# Patient Record
Sex: MALE | Race: WHITE | NOT HISPANIC OR LATINO | Employment: FULL TIME | URBAN - METROPOLITAN AREA
[De-identification: names, ages, dates, MRNs, and addresses within clinical notes are randomized per-mention and may not be internally consistent; named-entity substitution may affect disease eponyms.]

---

## 2017-04-18 ENCOUNTER — OFFICE VISIT (OUTPATIENT)
Dept: FAMILY MEDICINE | Facility: CLINIC | Age: 52
End: 2017-04-18

## 2017-04-18 VITALS
DIASTOLIC BLOOD PRESSURE: 87 MMHG | BODY MASS INDEX: 25.92 KG/M2 | WEIGHT: 175 LBS | OXYGEN SATURATION: 99 % | HEART RATE: 60 BPM | RESPIRATION RATE: 14 BRPM | SYSTOLIC BLOOD PRESSURE: 130 MMHG | HEIGHT: 69 IN | TEMPERATURE: 98.2 F

## 2017-04-18 DIAGNOSIS — L57.0 ACTINIC KERATOSIS: ICD-10-CM

## 2017-04-18 DIAGNOSIS — Z11.59 NEED FOR HEPATITIS C SCREENING TEST: ICD-10-CM

## 2017-04-18 DIAGNOSIS — Z23 NEED FOR PROPHYLACTIC VACCINATION WITH STREPTOCOCCUS PNEUMONIAE (PNEUMOCOCCUS) AND INFLUENZA VACCINES: ICD-10-CM

## 2017-04-18 DIAGNOSIS — Z00.00 ROUTINE HISTORY AND PHYSICAL EXAMINATION OF ADULT: Primary | ICD-10-CM

## 2017-04-18 RX ORDER — FLUOROURACIL 50 MG/G
CREAM TOPICAL DAILY
Qty: 40 G | Refills: 1 | Status: SHIPPED | OUTPATIENT
Start: 2017-04-18 | End: 2017-12-05

## 2017-04-18 NOTE — MR AVS SNAPSHOT
After Visit Summary   4/18/2017    Danie Newell    MRN: [id number]           Patient Information     Date Of Birth          1965        Visit Information        Provider Department      4/18/2017 9:40 AM Darius Rhoades MD [facility]        Today's Diagnoses     Routine history and physical examination of adult    -  1    Need for hepatitis C screening test        Need for prophylactic vaccination with Streptococcus pneumoniae (Pneumococcus) and Influenza vaccines        Actinic keratosis          Care Instructions    Will try the 5 FU cream on your chest lesion.  Will get your lab test to you.      Your medication list is printed, please keep this with you, it is helpful to bring this current list to any other medical appointments, the emergency room or hospital.    If you had lab testing today and your results are reassuring or normal they will be be mailed to you within 7 days.     If the lab tests need quick action we will call you with the results.   The phone number we will call with results is # [phone] (home) . If this is not the best number please call our clinic and change the number.    If you need any refills please call your pharmacy and they will contact us.    If you have any further concerns or wish to schedule another appointment you must call our office during normal business hours  [phone] (8-5:00 M-F)  If you have urgent medical questions that cannot wait  you may also call [phone] at any time of day.  If you have a medical emergency please call [phone].    Thank you for coming to [facility].          Follow-ups after your visit        Follow-up notes from your care team     Return in about 6 weeks (around 5/30/2017).      Who to contact     Please call your clinic at [phone] to:    Ask questions about your health    Make or cancel appointments    Discuss your medicines    Learn about your test results    Speak to your doctor   If you  "have compliments or concerns about an experience at your clinic, or if you wish to file a complaint, please contact [facility] Patient Relations at [phone] or email us at [email or web address]         Additional Information About Your Visit        MyChart Information     [email or web address] gives you secure access to your electronic health record. If you see a primary care provider, you can also send messages to your care team and make appointments. If you have questions, please call your primary care clinic.  If you do not have a primary care provider, please call [phone] and they will assist you.      Meridian-IQ is an electronic gateway that provides easy, online access to your medical records. With Meridian-IQ, you can request a clinic appointment, read your test results, renew a prescription or communicate with your care team.     To access your existing account, please contact your [facility] or call [phone] for assistance.        Care EveryWhere ID     This is your Care EveryWhere ID. This could be used by other organizations to access your [place] medical records  [id number]        Your Vitals Were     Pulse Temperature Respirations Height Pulse Oximetry BMI (Body Mass Index)    60 98.2  F (36.8  C) (Oral) 14 5' 8.9\" (175 cm) 99% 25.92 kg/m2       Blood Pressure from Last 3 Encounters:   04/18/17 130/87   12/14/16 123/75   11/22/16 138/87    Weight from Last 3 Encounters:   04/18/17 175 lb (79.4 kg)   12/14/16 182 lb 6.4 oz (82.7 kg)   11/22/16 180 lb 3.2 oz (81.7 kg)              We Performed the Following     ADMIN VACCINE, INITIAL     Hepatitis C Antibody ([facility])     Pneumococcal vaccine 23 valent PPSV23  (Pneumovax) [08844]          Today's Medication Changes          These changes are accurate as of: 4/18/17 10:36 AM.  If you have any questions, ask your nurse or doctor.               Start taking these medicines.        " Dose/Directions    fluorouracil 5 % cream   Commonly known as:  EFUDEX   Used for:  Actinic keratosis   Started by:  Darius Rhoades MD        Apply topically daily   Quantity:  40 g   Refills:  1            Where to get your medicines      These medications were sent to [id number] - [place], [place] - [address]  AT [facility] [address]  [address] [place] [address] [address], [place] [facility] [address]     Phone:  [phone]     fluorouracil 5 % cream                Primary Care Provider Office Phone # Fax #    Darius Rhoades [phone]       [facility] [address] [place] [id number]        Thank you!     Thank you for choosing [facility]  for your care. Our goal is always to provide you with excellent care. Hearing back from our patients is one way we can continue to improve our services. Please take a few minutes to complete the written survey that you may receive in the mail after your visit with us. Thank you!             Your Updated Medication List - Protect others around you: Learn how to safely use, store and throw away your medicines at www.disposemymeds.org.          This list is accurate as of: 4/18/17 10:36 AM.  Always use your most recent med list.                   Brand Name Dispense Instructions for use    fluorouracil 5 % cream    EFUDEX    40 g    Apply topically daily

## 2017-04-18 NOTE — PROGRESS NOTES
"Chief Complaint   Patient presents with     Physical     Derm Problem     lesion on chest      ROS  CONSTITUTIONAL:  NEGATIVE for fever, chills, change in weight  RESP:  NEGATIVE for significant cough or SOB  CV:  NEGATIVE for chest pain, palpitations or peripheral edema  GI:  NEGATIVE for nausea, abdominal pain, heartburn, or change in bowel habits  : NEGATIVE for dysuria, hematuria, and increased frequency.    PMH, Fam Hx, and Social Hx: Reviewed  Family History   Problem Relation Age of Onset     Breast Cancer Mother      Breast Cancer Paternal Grandmother      C.A.D. No family hx of      DIABETES No family hx of      Hypertension No family hx of      Cancer - colorectal No family hx of      Prostate Cancer No family hx of      Coronary Artery Disease No family hx of      Hyperlipidemia No family hx of      Colon Cancer No family hx of      Anesthesia Reaction No family hx of      Other Cancer Father 77     Waldonstrom Microglobulinemia sub type of non hodgkin lymphoma       Vital Signs: Reviewed  Blood pressure 130/87, pulse 60, temperature 98.2  F (36.8  C), temperature source Oral, resp. rate 14, height 5' 8.9\" (175 cm), weight 175 lb (79.4 kg), SpO2 99 %.  Body mass index is 25.92 kg/(m^2).    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  He feels good, he is back to normal, he was seen for Lofgrens Syndrome this past winter.  The swelling in his lower legs is gone and he has no more joint aches and he is having no breathing problems.  He has no more nodules in his leg muscles.   He has a skin lesion on his chest that is red and enlarging.  It bled when he dried himself off after a shower earlier this week.  He still is getting up at night to urinate and has some trouble getting it started and he does not want to try medications at this time.  He is having no problems with his breathing.  He has had no other skin rashes.  He does " have spots and he has had a lot of sun exposure over the years.   OBJECTIVE:   APPEARANCE:  Healthy-appearing, no acute distress.   HEENT:  Funduscopic exam, no AV crossing changes, hemorrhage or exudate.  TMs clear.  Throat clear.  Oral mucosa normal.  Teeth in good repair.   NECK:  No adenopathy, no bruits, no thyroid abnormalities.   LUNGS:  Clear.   CARDIOVASCULAR:  Regular rhythm, no S3, S4 murmurs.   ABDOMEN:  Soft, nontender, no masses.   EXTREMITIES:  No edema.   SKIN:  There is a 1 cm diameter lesion on her chest wall in a V from the shirt V where he has had  multiple sunburns over the years.  Has used under dermatoscope it is reddened and appears to be actinic keratoses.   ASSESSMENT:   1.  Health screening exam.   2.  Actinic keratosis.   PLAN:  Will put 5-FU over his entire sun-exposed area concentrating particularly on red lesion.  Recheck in 6 weeks and will get hepatitis C screen today and give him a pneumonia shot because of the Jerome syndrome and potential for lung issues.   Will reevaluate his skin in 6 weeks and will check him back for a health screening exam in 1 year.  We decided not to do a repeat PSA today.   The patient involved in medical decision making throughout the visit.   Recheck as above.

## 2017-04-18 NOTE — PATIENT INSTRUCTIONS
Will try the 5 FU cream on your chest lesion.  Will get your lab test to you.  Recheck 6 weeks.    Your medication list is printed, please keep this with you, it is helpful to bring this current list to any other medical appointments, the emergency room or hospital.    If you had lab testing today and your results are reassuring or normal they will be be mailed to you within 7 days.     If the lab tests need quick action we will call you with the results.   The phone number we will call with results is # [phone] (home) . If this is not the best number please call our clinic and change the number.    If you need any refills please call your pharmacy and they will contact us.    If you have any further concerns or wish to schedule another appointment you must call our office during normal business hours  [phone] (8-5:00 M-F)  If you have urgent medical questions that cannot wait  you may also call [phone] at any time of day.  If you have a medical emergency please call [phone].    Thank you for coming to [facility].

## 2017-04-19 ENCOUNTER — TELEPHONE (OUTPATIENT)
Dept: FAMILY MEDICINE | Facility: CLINIC | Age: 52
End: 2017-04-19

## 2017-04-19 LAB — HCV AB SER QL: NEGATIVE

## 2017-04-19 NOTE — TELEPHONE ENCOUNTER
Spoke with patient regarding getting an appt some time between 5/31 - 6/8. Informed patient that Dr. Gomez's schedule is not out for June yet and once it is out (some time in May) I'll give him a call back and arrange the appt to come in and see Dr. Rhoades.

## 2017-04-19 NOTE — PROGRESS NOTES
Jani,  Good news! Your hep C screen is negative as expected.  We did talk about getting a chest xray yesterday and I do not think that I ordered.  As I thought about it over night, there is not much reason to do at this time.  I can always order it as a nurse visit if you would like.  Shabbir Alas

## 2017-06-08 ENCOUNTER — OFFICE VISIT (OUTPATIENT)
Dept: FAMILY MEDICINE | Facility: CLINIC | Age: 52
End: 2017-06-08

## 2017-06-08 VITALS
BODY MASS INDEX: 27.02 KG/M2 | DIASTOLIC BLOOD PRESSURE: 79 MMHG | OXYGEN SATURATION: 97 % | TEMPERATURE: 98.4 F | SYSTOLIC BLOOD PRESSURE: 125 MMHG | WEIGHT: 182.4 LBS | HEART RATE: 63 BPM | RESPIRATION RATE: 20 BRPM | HEIGHT: 69 IN

## 2017-06-08 DIAGNOSIS — L57.0 AK (ACTINIC KERATOSIS): Primary | ICD-10-CM

## 2017-06-08 NOTE — PROGRESS NOTES
Chief Complaint   Patient presents with     RECHECK     Skin Treatment on chest, pt. reported itchy and irritated.      SUBJECTIVE:  This is a 52-year-old male in for check of actinic keratoses and bleeding skin lesion on the chest wall in the V distribution below his neck where his shirt is open and exposed to sun through his lifetime.  He had a lot of actinic keratoses in the area.  He has been using the 5-FU creamdaily for about 2 months.  He has gotten a scabby reaction.   PLAN:  There still appears to be some reactive skin in the area and I instructed to continue with the cream with about a cm margin into what appears to be good skin for about 3 more weeks.  We will recheck in the fall to recheck the skin.  He has suspicious areas on his face for potential treatment in the winter and get a chest  X-ray to follow up for his Jerome's syndrome.   The patient involved in medical decision making throughout the visit.   Recheck in the fall earlier if worsening course or problems.

## 2017-06-08 NOTE — PATIENT INSTRUCTIONS
Your skin appears to be reacting as expected.  Will have you continue for another 3 weeks.  Use zinc oxide or 50+ suncreen on the area if you are going to be in the sun.  Will recheck you in the fall and check a chest xray for the Jerome's.  Bill

## 2017-06-08 NOTE — MR AVS SNAPSHOT
After Visit Summary   6/8/2017    Danie Newell    MRN: [id number]           Patient Information     Date Of Birth          1965        Visit Information        Provider Department      6/8/2017 9:00 AM Darius Rhoades MD [facility]        Today's Diagnoses     AK (actinic keratosis)    -  1      Care Instructions    Your skin appears to be reacting as expected.  Will have you continue for another 3 weeks.  Use zinc oxide or 50+ suncreen on the area if you are going to be in the sun.  Will recheck you in the fall and check a chest xray for the Jerome's.  Bill          Follow-ups after your visit        Follow-up notes from your care team     Return in about 3 months (around 9/8/2017).      Who to contact     Please call your clinic at [phone] to:    Ask questions about your health    Make or cancel appointments    Discuss your medicines    Learn about your test results    Speak to your doctor   If you have compliments or concerns about an experience at your clinic, or if you wish to file a complaint, please contact [facility] Patient Relations at [phone] or email us at [email or web address]         Additional Information About Your Visit        MyChart Information     Nusirtt gives you secure access to your electronic health record. If you see a primary care provider, you can also send messages to your care team and make appointments. If you have questions, please call your primary care clinic.  If you do not have a primary care provider, please call [phone] and they will assist you.      Asker is an electronic gateway that provides easy, online access to your medical records. With Asker, you can request a clinic appointment, read your test results, renew a prescription or communicate with your care team.     To access your existing account, please contact your [facility] or call [phone] for  "assistance.        Care EveryWhere ID     This is your Care EveryWhere ID. This could be used by other organizations to access your [place] medical records  [id number]        Your Vitals Were     Pulse Temperature Respirations Height Pulse Oximetry BMI (Body Mass Index)    63 98.4  F (36.9  C) (Oral) 20 5' 9\" (175.3 cm) 97% 26.94 kg/m2       Blood Pressure from Last 3 Encounters:   06/08/17 125/79   04/18/17 130/87   12/14/16 123/75    Weight from Last 3 Encounters:   06/08/17 182 lb 6.4 oz (82.7 kg)   04/18/17 175 lb (79.4 kg)   12/14/16 182 lb 6.4 oz (82.7 kg)              Today, you had the following     No orders found for display       Primary Care Provider Office Phone # Fax #    Darius Rhoades [phone]       [facility] [address] [place] [address]        Thank you!     Thank you for choosing [facility]  for your care. Our goal is always to provide you with excellent care. Hearing back from our patients is one way we can continue to improve our services. Please take a few minutes to complete the written survey that you may receive in the mail after your visit with us. Thank you!             Your Updated Medication List - Protect others around you: Learn how to safely use, store and throw away your medicines at www.disposemymeds.org.          This list is accurate as of: 6/8/17  9:14 AM.  Always use your most recent med list.                   Brand Name Dispense Instructions for use    fluorouracil 5 % cream    EFUDEX    40 g    Apply topically daily         "

## 2017-08-03 ENCOUNTER — TRANSFERRED RECORDS (OUTPATIENT)
Dept: HEALTH INFORMATION MANAGEMENT | Facility: CLINIC | Age: 52
End: 2017-08-03

## 2017-12-05 ENCOUNTER — OFFICE VISIT (OUTPATIENT)
Dept: FAMILY MEDICINE | Facility: CLINIC | Age: 52
End: 2017-12-05

## 2017-12-05 VITALS
SYSTOLIC BLOOD PRESSURE: 120 MMHG | BODY MASS INDEX: 26.66 KG/M2 | HEIGHT: 69 IN | RESPIRATION RATE: 16 BRPM | OXYGEN SATURATION: 99 % | HEART RATE: 63 BPM | WEIGHT: 180 LBS | DIASTOLIC BLOOD PRESSURE: 75 MMHG | TEMPERATURE: 98.2 F

## 2017-12-05 DIAGNOSIS — Z23 FLU VACCINE NEED: ICD-10-CM

## 2017-12-05 DIAGNOSIS — D86.9 LOFGREN'S SYNDROME: Primary | ICD-10-CM

## 2017-12-05 NOTE — NURSING NOTE
DUE FOR:  Offer Flu shot patient agrees  Patient is up to date on routine immunizations and screening tests for preventative care.

## 2017-12-05 NOTE — MR AVS SNAPSHOT
"              After Visit Summary   12/5/2017    Danie Newell    MRN: [id number]           Patient Information     Date Of Birth          1965        Visit Information        Provider Department      12/5/2017 9:20 AM Darius Rhoades MD [facility]        Today's Diagnoses     Jerome's syndrome    -  1    Flu vaccine need           Follow-ups after your visit        Who to contact     Please call your clinic at [phone] to:    Ask questions about your health    Make or cancel appointments    Discuss your medicines    Learn about your test results    Speak to your doctor   If you have compliments or concerns about an experience at your clinic, or if you wish to file a complaint, please contact [facility] Patient Relations at [phone] or email us at [email or web address]         Additional Information About Your Visit        MyChart Information     Polaris Wirelesst gives you secure access to your electronic health record. If you see a primary care provider, you can also send messages to your care team and make appointments. If you have questions, please call your primary care clinic.  If you do not have a primary care provider, please call [phone] and they will assist you.      [employer] Health is an electronic gateway that provides easy, online access to your medical records. With [employer] Health, you can request a clinic appointment, read your test results, renew a prescription or communicate with your care team.     To access your existing account, please contact your [facility] or call [phone] for assistance.        Care EveryWhere ID     This is your Care EveryWhere ID. This could be used by other organizations to access your [place] medical records  [id number]        Your Vitals Were     Pulse Temperature Respirations Height Pulse Oximetry BMI (Body Mass Index)    63 98.2  F (36.8  C) (Oral) 16 5' 8.9\" (175 cm) 99% 26.66 kg/m2    "    Blood Pressure from Last 3 Encounters:   12/05/17 120/75   06/08/17 125/79   04/18/17 130/87    Weight from Last 3 Encounters:   12/05/17 180 lb (81.6 kg)   06/08/17 182 lb 6.4 oz (82.7 kg)   04/18/17 175 lb (79.4 kg)              We Performed the Following     ADMIN VACCINE, INITIAL     FLU VAC PRESRV FREE QUAD SPLIT VIR IM, 0.5 mL dosage     XR CHEST 2 VW        Primary Care Provider Office Phone # Fax #    Darius Rhoades [phone]       [facility] [address] [facility] [address]        Equal Access to Services     MAYANK MACDONALD : Hadii venancio Shannon, waoscar bryan, ish kaalmada giancarlo, jose mohan. So [facility] [place] [phone].    ATENCIÓN: Si habla español, tiene a calixto disposición servicios gratuitos de asistencia lingüística. Llame al [phone].    We comply with applicable federal civil rights laws and [place] laws. We do not discriminate on the basis of race, color, national origin, age, disability, sex, sexual orientation, or gender identity.            Thank you!     Thank you for choosing [facility]  for your care. Our goal is always to provide you with excellent care. Hearing back from our patients is one way we can continue to improve our services. Please take a few minutes to complete the written survey that you may receive in the mail after your visit with us. Thank you!             Your Updated Medication List - Protect others around you: Learn how to safely use, store and throw away your medicines at www.disposemymeds.org.      Notice  As of 12/5/2017  6:58 PM    You have not been prescribed any medications.

## 2017-12-05 NOTE — PROGRESS NOTES
"Chief Complaint   Patient presents with     RECHECK     skin     Radiology Visit     CXR due to Jerome's      /75 (BP Location: Right arm, Patient Position: Chair, Cuff Size: Adult Regular)  Pulse 63  Temp 98.2  F (36.8  C) (Oral)  Resp 16  Ht 5' 8.9\" (175 cm)  Wt 180 lb (81.6 kg)  SpO2 99%  BMI 26.66 kg/m2    SUBJECTIVE:  This is a 52-year-old male in for check of his Jerome syndrome.  It was about 1 year ago that he developed his initial symptoms of swelling in the periphery and feeling ill.  He was eventually diagnosed with Jerome syndrome.  Our plan was to recheck a chest x-ray today to see if there is any change in the pattern.  He has been feeling well.  There has been no fever, chills, nausea, vomiting, diarrhea, cough or fatigue.  He is back to his usual activities and having no restrictions in his physical activity.  He is having no swelling in his legs.   OBJECTIVE:   APPEARANCE:  Well male, appears stated age.   NECK:  No adenopathy.   LUNGS:  Clear.   CARDIOVASCULAR:  Regular rhythm.   ABDOMEN:  Soft, nontender, no masses, no hepatosplenomegaly.   EXTREMITIES:  No edema.   ASSESSMENT:  Jerome syndrome.   PLAN:  We will check a chest x-ray and a CRP, will review results to patient when available.  Will continue current cares.  Chest x-ray pending.   The patient involved in medical decision making throughout the visit.     Xray reviewed. Appears normal. Await radiology review with comparison to previous CXR.  "

## 2017-12-07 NOTE — PROGRESS NOTES
Jani,  Your chest xray is normal other than some calcification in the lymph nodes.  No indication of changes.  We can repeat in one year.  Bill

## 2018-07-10 ENCOUNTER — OFFICE VISIT (OUTPATIENT)
Dept: FAMILY MEDICINE | Facility: CLINIC | Age: 53
End: 2018-07-10
Payer: COMMERCIAL

## 2018-07-10 VITALS
HEART RATE: 59 BPM | SYSTOLIC BLOOD PRESSURE: 126 MMHG | RESPIRATION RATE: 18 BRPM | BODY MASS INDEX: 26.63 KG/M2 | WEIGHT: 186 LBS | HEIGHT: 70 IN | DIASTOLIC BLOOD PRESSURE: 78 MMHG | OXYGEN SATURATION: 98 % | TEMPERATURE: 98.3 F

## 2018-07-10 DIAGNOSIS — Z00.00 ROUTINE HISTORY AND PHYSICAL EXAMINATION OF ADULT: Primary | ICD-10-CM

## 2018-07-10 DIAGNOSIS — L72.3 SEBACEOUS CYST: ICD-10-CM

## 2018-07-10 DIAGNOSIS — N40.1 BENIGN NON-NODULAR PROSTATIC HYPERPLASIA WITH LOWER URINARY TRACT SYMPTOMS: ICD-10-CM

## 2018-07-10 DIAGNOSIS — D86.9 LOFGREN'S SYNDROME: ICD-10-CM

## 2018-07-10 LAB — PSA SERPL-MCNC: 1.8 NG/ML (ref 0–3.5)

## 2018-07-10 NOTE — MR AVS SNAPSHOT
After Visit Summary   7/10/2018    Danie Newell    MRN: [id number]           Patient Information     Date Of Birth          1965        Visit Information        Provider Department      7/10/2018 10:20 AM Darius Rhoades MD [facility]        Today's Diagnoses     Routine history and physical examination of adult    -  1    Jerome's syndrome        Benign non-nodular prostatic hyperplasia with lower urinary tract symptoms          Care Instructions    Your health is good.  Will get the PSA result to you.  Recheck 1 year, will get chest XR then.            Follow-ups after your visit        Follow-up notes from your care team     Return in about 1 year (around 7/10/2019) for Physical Exam.      Who to contact     Please call your clinic at [phone] to:    Ask questions about your health    Make or cancel appointments    Discuss your medicines    Learn about your test results    Speak to your doctor            Additional Information About Your Visit        MyChart Information     [employer] gives you secure access to your electronic health record. If you see a primary care provider, you can also send messages to your care team and make appointments. If you have questions, please call your primary care clinic.  If you do not have a primary care provider, please call [phone] and they will assist you.      [employer] is an electronic gateway that provides easy, online access to your medical records. With [employer], you can request a clinic appointment, read your test results, renew a prescription or communicate with your care team.     To access your existing account, please contact your [facility] or call [phone] for assistance.        Care EveryWhere ID     This is your Care EveryWhere ID. This could be used by other organizations to access your [facility] medical records  [id number]        Your Vitals Were     Pulse Temperature Respirations Height  "Pulse Oximetry BMI (Body Mass Index)    59 98.3  F (36.8  C) (Oral) 18 5' 9.75\" (177.2 cm) 98% 26.88 kg/m2       Blood Pressure from Last 3 Encounters:   07/10/18 126/78   12/05/17 120/75   06/08/17 125/79    Weight from Last 3 Encounters:   07/10/18 186 lb (84.4 kg)   12/05/17 180 lb (81.6 kg)   06/08/17 182 lb 6.4 oz (82.7 kg)              We Performed the Following     PSA Screening ([employer])        Primary Care Provider Office Phone # Fax #    Darius Rhoades [phone]       [address] [place] [address]        Equal Access to Services     SOSA MACDONALD : Robert Shannon, waoscar luqadaha, qaybta kaalmada giancarlo, jose lucas . So [facility] [phone].    ATENCIÓN: Si habla español, tiene a calixto disposición servicios gratuitos de asistencia lingüística. Llame al [phone].    We comply with applicable federal civil rights laws and [place] laws. We do not discriminate on the basis of race, color, national origin, age, disability, sex, sexual orientation, or gender identity.            Thank you!     Thank you for choosing [facility]  for your care. Our goal is always to provide you with excellent care. Hearing back from our patients is one way we can continue to improve our services. Please take a few minutes to complete the written survey that you may receive in the mail after your visit with us. Thank you!             Your Updated Medication List - Protect others around you: Learn how to safely use, store and throw away your medicines at www.disposemymeds.org.      Notice  As of 7/10/2018 10:48 AM    You have not been prescribed any medications.      "

## 2018-07-10 NOTE — PROGRESS NOTES
"Chief Complaint   Patient presents with     Physical     No concerns.     /78  Pulse 59  Temp 98.3  F (36.8  C) (Oral)  Resp 18  Ht 5' 9.75\" (177.2 cm)  Wt 186 lb (84.4 kg)  SpO2 98%  BMI 26.88 kg/m2    SUBJECTIVE:  This is a 53-year-old male in for health screening exam.  He is doing well.  He has had none of the Jerome's syndrome symptoms and his chest x-ray last winter was normal.  His complete review of symptoms is negative.  We reviewed his family history and he has no particular concerns today.  He is on no medications and has no allergies.   He is physically active.  He is planning to ski in [place] this coming winter.  He is able to exercise at high levels.  His weight is at the borderline, but he is fairly muscular and this may be affecting his BMI.   OBJECTIVE:   APPEARANCE:  No acute distress, appears normal weight for height.   HEENT:  Funduscopic exam normal.  TMs clear and mobile.  Throat clear.   NECK:  No adenopathy.  Thyroid are normal.   LUNGS:  Clear.   CARDIOVASCULAR:  Regular rhythm, no S3, S4 murmurs.   ABDOMEN:  Soft, nontender, no masses, no hepatosplenomegaly.   PROSTATE EXAM:  Shows normal prostate, central sulcus present, no nodules.   EXTREMITIES:  No edema.   SKIN:  He has a sebaceous cyst at the midline at about the level of the mid scapula on the back.  He also has a lesion that he has picked at which has a black eschar.  If it does not look like normal skin after it heals we should recheck that.   ASSESSMENT:   1.  Health screening exam.     2.  Jerome's syndrome, stable.     3.  Mild prostate hypertrophy.     4.  Sebaceous cyst.   PLAN:  Recheck the sebaceous cyst at his convenience.  We will continue with current healthcare strategies and recheck exam in 1 year with a chest x-ray for Jerome's at his next physical exam with the PSA results to him today.  We scheduled for sebaceous cyst removal at his convenience.  He should recheck if the skin lesion does not appear to " be normal skin after it heals.   The patient involved in medical decision making throughout the visit.   Recheck physical in 1 year.

## 2018-12-04 ENCOUNTER — OFFICE VISIT (OUTPATIENT)
Dept: FAMILY MEDICINE | Facility: CLINIC | Age: 53
End: 2018-12-04
Payer: COMMERCIAL

## 2018-12-04 VITALS
RESPIRATION RATE: 12 BRPM | HEIGHT: 69 IN | DIASTOLIC BLOOD PRESSURE: 78 MMHG | WEIGHT: 184 LBS | OXYGEN SATURATION: 98 % | BODY MASS INDEX: 27.25 KG/M2 | TEMPERATURE: 98 F | HEART RATE: 63 BPM | SYSTOLIC BLOOD PRESSURE: 127 MMHG

## 2018-12-04 DIAGNOSIS — Z23 NEED FOR INFLUENZA VACCINATION: ICD-10-CM

## 2018-12-04 DIAGNOSIS — L72.3 SEBACEOUS CYST: Primary | ICD-10-CM

## 2018-12-04 NOTE — PROGRESS NOTES
"Chief Complaint   Patient presents with     Procedure     cyst removal on back and flu shot     Medication Reconciliation     completed     /78  Pulse 63  Temp 98  F (36.7  C) (Oral)  Resp 12  Ht 5' 8.9\" (175 cm)  Wt 184 lb (83.5 kg)  SpO2 98%  BMI 27.25 kg/m2    SUBJECTIVE:  A 53-year-old male in for removal of a sebaceous cyst on his back that has been there for a long time.  It is occasionally leaking fluid.      OBJECTIVE:  Sebaceous cyst seen at previous visit.  It was inspected today.  There is no sign of infection.        We discussed risks and benefits and consent form was signed.      PROCEDURE NOTE:  The area was anesthetized with 1% lidocaine with epinephrine.  A vertical incision placed over the cyst through the cyst dimple.  The cyst that broke open during the incision.  The material was expressed from the cyst site and the wall was dissected away.  It took longer than usual because the cyst wall was scarred to the surrounding tissue.  When the area looked clean of the cyst material wound was closed with 2 mattress sutures.      The patient tolerated the procedure well.      Blood loss minimal.      Recheck in 7-10 days for suture removal or earlier if worsening course or signs of infection.      The patient may shower tomorrow.      The patient involved in medical decision making throughout the visit.       "

## 2018-12-04 NOTE — NURSING NOTE
Injectable influenza vaccine documentation    1. Has the patient received the information for the influenza vaccine? YES    2. Does the patient have a severe allergy to eggs (Patients with a severe egg allergy should be assessed by a medical provider, RN, or clinical pharmacist. If they receive the influenza vaccine, please have them observed for 15 minutes.)? No    3. Has the patient had an allergic reaction to previous influenza vaccines? No    4. Has the patient had any severe allergic reactions to past influenza vaccines ? No       5. Does patient have a history of Guillain-[place] syndrome? No      Based on responses above, I administered the influenza vaccine.  LISA LUCERO, CMA

## 2018-12-04 NOTE — MR AVS SNAPSHOT
After Visit Summary   12/4/2018    Danie Newell    MRN: [id number]           Patient Information     Date Of Birth          1965        Visit Information        Provider Department      12/4/2018 9:20 AM Darius Rhoades MD; [facility] PROCEDURE ROOM [facility]        Today's Diagnoses     Sebaceous cyst    -  1    Need for influenza vaccination          Care Instructions    Sutures out in 7-10 days.  Check if signs of infection.  Can shower tomorrow.  WR          Follow-ups after your visit        Follow-up notes from your care team     Return in about 1 week (around 12/11/2018) for Sutures out.      Your next 10 appointments already scheduled     Dec 11, 2018 11:40 AM CST   Return Visit with Darius Rhoades MD   [facility] ([facility])    [address] [place] [address]   [phone]              Who to contact     Please call your clinic at [phone] to:    Ask questions about your health    Make or cancel appointments    Discuss your medicines    Learn about your test results    Speak to your doctor            Additional Information About Your Visit        MyChart Information     [employer] gives you secure access to your electronic health record. If you see a primary care provider, you can also send messages to your care team and make appointments. If you have questions, please call your primary care clinic.  If you do not have a primary care provider, please call [phone] and they will assist you.      [employer] is an electronic gateway that provides easy, online access to your medical records. With [employer], you can request a clinic appointment, read your test results, renew a prescription or communicate with your care team.     To access your existing account, please contact your [facility] or call [phone] for assistance.        Care EveryWhere ID     This is your Care EveryWhere ID. This could be used by other  "organizations to access your [facility] medical records  [id number]        Your Vitals Were     Pulse Temperature Respirations Height Pulse Oximetry BMI (Body Mass Index)    63 98  F (36.7  C) (Oral) 12 5' 8.9\" (175 cm) 98% 27.25 kg/m2       Blood Pressure from Last 3 Encounters:   12/04/18 127/78   07/10/18 126/78   12/05/17 120/75    Weight from Last 3 Encounters:   12/04/18 184 lb (83.5 kg)   07/10/18 186 lb (84.4 kg)   12/05/17 180 lb (81.6 kg)              We Performed the Following     ADMIN VACCINE, INITIAL     EXC BENIGN SKIN LESION TRUNK/ARM/LEG 1.1-2.0 CM     FLU VAC PRESRV FREE QUAD SPLIT VIR IM, 0.5 mL dosage        Primary Care Provider Office Phone # Fax #    Darius Rhoades [phone]       [address] [place] [address]        Equal Access to Services     MAYANK MACDONALD : Hadii aad ku hadasho Soomaali, waaxda luqadaha, qaybta kaalmada adeegyada, waxay idiin haysamann jaime lucas . So [facility] [phone].    ATENCIÓN: Si habla español, tiene a calixto disposición servicios gratuitos de asistencia lingüística. Llame al [phone].    We comply with applicable federal civil rights laws and [place] laws. We do not discriminate on the basis of race, color, national origin, age, disability, sex, sexual orientation, or gender identity.            Thank you!     Thank you for choosing [facility]  for your care. Our goal is always to provide you with excellent care. Hearing back from our patients is one way we can continue to improve our services. Please take a few minutes to complete the written survey that you may receive in the mail after your visit with us. Thank you!             Your Updated Medication List - Protect others around you: Learn how to safely use, store and throw away your medicines at www.disposemymeds.org.      Notice  As of 12/4/2018  6:19 PM    You have not been prescribed any medications.      "

## 2018-12-11 ENCOUNTER — OFFICE VISIT (OUTPATIENT)
Dept: FAMILY MEDICINE | Facility: CLINIC | Age: 53
End: 2018-12-11
Payer: COMMERCIAL

## 2018-12-11 VITALS
BODY MASS INDEX: 27.11 KG/M2 | TEMPERATURE: 98 F | HEIGHT: 69 IN | WEIGHT: 183 LBS | DIASTOLIC BLOOD PRESSURE: 86 MMHG | SYSTOLIC BLOOD PRESSURE: 131 MMHG | HEART RATE: 56 BPM | OXYGEN SATURATION: 99 % | RESPIRATION RATE: 16 BRPM

## 2018-12-11 DIAGNOSIS — L72.3 SEBACEOUS CYST: Primary | ICD-10-CM

## 2018-12-11 ASSESSMENT — MIFFLIN-ST. JEOR: SCORE: 1655.46

## 2018-12-11 NOTE — PROGRESS NOTES
"Suture Removal and Recheck Medication    /86   Pulse 56   Temp 98  F (36.7  C) (Oral)   Resp 16   Ht 1.753 m (5' 9\")   Wt 83 kg (183 lb)   SpO2 99%   BMI 27.02 kg/m      SUBJECTIVE:  A 53-year-old male presents for suture removal after excision of sebaceous cyst last week.  Wound is well-healed.  He is having no problems.  Sutures removed without difficulty.      ASSESSMENT:  Suture removal.      PLAN:  Return as needed if additional cysts occur.  He will schedule his health screening exam sometime early in the coming calendar year.         "

## 2019-04-09 ENCOUNTER — RECORDS - HEALTHEAST (OUTPATIENT)
Dept: ADMINISTRATIVE | Facility: OTHER | Age: 54
End: 2019-04-09

## 2019-04-09 ENCOUNTER — HOSPITAL ENCOUNTER (OUTPATIENT)
Dept: RADIOLOGY | Facility: HOSPITAL | Age: 54
Discharge: HOME | End: 2019-04-09
Attending: FAMILY MEDICINE

## 2019-04-09 ENCOUNTER — OFFICE VISIT (OUTPATIENT)
Dept: FAMILY MEDICINE | Facility: CLINIC | Age: 54
End: 2019-04-09
Payer: COMMERCIAL

## 2019-04-09 VITALS
RESPIRATION RATE: 18 BRPM | TEMPERATURE: 97.9 F | OXYGEN SATURATION: 99 % | SYSTOLIC BLOOD PRESSURE: 141 MMHG | HEART RATE: 62 BPM | WEIGHT: 183.8 LBS | BODY MASS INDEX: 27.22 KG/M2 | HEIGHT: 69 IN | DIASTOLIC BLOOD PRESSURE: 85 MMHG

## 2019-04-09 DIAGNOSIS — D86.9 LOFGREN'S SYNDROME: ICD-10-CM

## 2019-04-09 DIAGNOSIS — R05.9 COUGH: Primary | ICD-10-CM

## 2019-04-09 LAB
HCT VFR BLD AUTO: 48.3 % (ref 40–53)
HEMOGLOBIN: 15 G/DL (ref 13.3–17.7)
MCH RBC QN AUTO: 29.2 PG (ref 26.5–35)
MCHC RBC AUTO-ENTMCNC: 31.1 G/DL (ref 32–36)
MCV RBC AUTO: 94.2 FL (ref 78–100)
PLATELET # BLD AUTO: 252 K/UL (ref 150–450)
RBC # BLD AUTO: 5.13 M/UL (ref 4.4–5.9)
WBC # BLD AUTO: 4.8 K/UL (ref 4–11)

## 2019-04-09 ASSESSMENT — MIFFLIN-ST. JEOR: SCORE: 1654.09

## 2019-04-09 NOTE — LETTER
"April 16, 2019      Danie Newell  [address] [address]        Dear Danie,    Your Chest X-ray read is back.  There does not appear to be any active disease.  The previously identified nodules were read as \"sarcoid\" but apparently have not changed.  We can continue to observe, recheck you and check pulmonary function tests here in the clinic, or send you to pulmonary medicine.    Please see below for your test results.    Resulted Orders   CBC with Plt (P )   Result Value Ref Range    WBC 4.8 4.0 - 11.0 K/uL    RBC 5.13 4.40 - 5.90 M/uL    Hemoglobin 15.0 13.3 - 17.7 g/dL    Hematocrit 48.3 40.0 - 53.0 %    MCV 94.2 78.0 - 100.0 fL    MCH 29.2 26.5 - 35.0 pg    MCHC 31.1 (L) 32.0 - 36.0 g/dL    Platelets 252.0 150.0 - 450.0 K/uL       If you have any questions, please call the clinic to make an appointment.    Sincerely,    Darius Rhoades MD  "

## 2019-04-09 NOTE — PROGRESS NOTES
"URI and Medication Reconciliation    /85   Pulse 62   Temp 97.9  F (36.6  C) (Oral)   Resp 18   Ht 1.753 m (5' 9\")   Wt 83.4 kg (183 lb 12.8 oz)   SpO2 99%   BMI 27.14 kg/m      SUBJECTIVE:  This is a 54-year-old male that was traveling in [place] [facility] [place] in late December/early January and on his return developed a cough.  It was quite severe at the start, but he does not recall having fever, chills or muscle aches.  The cough has gradually improved, but he has a persistent cough.  It comes and goes without any clear provocation.  He feels a little more short of breath when he climbs stairs and exercises.  He ran a mile the other day on the treadmill, and he did 8 minutes, and it felt like he was not in the physical condition that he used to be in his younger years.  He gets winded skiing.  He does have a history of Jerome syndrome and has not been having difficulty with that since it cleared about a year ago.        He is not currently having any fever or chills.  The cough is not productive.  He has not had any weight loss or change in his diet.  [place] and thinks he may be a little more fatigued at altitude than he is used to.      OBJECTIVE:   APPEARANCE:  In no acute distress.   LUNGS:  Clear.  He has no cough response or extra sounds with forced expiration.   CARDIOVASCULAR:  Regular rhythm.  No S3, S4 or murmurs.   EXTREMITIES:  No edema.  No skin rash noted.      ASSESSMENT:     1.  Persistent cough.   2.  History of Jerome syndrome.      PLAN:  We will check a CBC to see if his white count is okay and check a chest x-ray.  We will want to look for any occult pneumonia, too, to follow up with the Jerome syndrome.      We will get results to the patient when available, and he may continue his current activities without restriction.      The patient involved in medical decision making throughout the visit.      Recheck based on lab studies.       "

## 2019-04-09 NOTE — LETTER
April 10, 2019      Danie Newell  [address] [address]        Dear Danie,    Your white count looks ok and your hemoglobin is great.  I have not seen your chest xray and will keep looking for the report.    Please see below for your test results.    Resulted Orders   CBC with Plt (P )   Result Value Ref Range    WBC 4.8 4.0 - 11.0 K/uL    RBC 5.13 4.40 - 5.90 M/uL    Hemoglobin 15.0 13.3 - 17.7 g/dL    Hematocrit 48.3 40.0 - 53.0 %    MCV 94.2 78.0 - 100.0 fL    MCH 29.2 26.5 - 35.0 pg    MCHC 31.1 (L) 32.0 - 36.0 g/dL    Platelets 252.0 150.0 - 450.0 K/uL       If you have any questions, please call the clinic to make an appointment.    Sincerely,    Darius Rhoades MD

## 2019-04-10 NOTE — RESULT ENCOUNTER NOTE
Jani,  Your white count looks ok and your hemoglobin is great.  I have not seen your chest xray and will keep looking for the report.  Bill

## 2019-04-15 NOTE — RESULT ENCOUNTER NOTE
"Jani,  Your CXR read is back.  There does not appear to be any active disease.  The previously identified nodules were read as \"sarcoid\" but apparently have not changed.  We can continue to observe, recheck you and check pulmonary function tests here in the clinic, or send you to pulmonary medicine.  Shabbir"

## 2019-04-16 ENCOUNTER — TELEPHONE (OUTPATIENT)
Dept: FAMILY MEDICINE | Facility: CLINIC | Age: 54
End: 2019-04-16

## 2019-04-16 NOTE — TELEPHONE ENCOUNTER
Called patient and verified patient does not smoke. Advised patient no prep for pulmonary function test. Patient verbalized understanding. Karolyn GUTHRIE

## 2019-04-16 NOTE — TELEPHONE ENCOUNTER
[facility] phone call message- general phone call:    Reason for call: Patient states he would like a call back to know if he needs to prepare for the pulmonary function test. Please call and advise.     Return call needed: Yes    OK to leave a message on voice mail? Yes    Primary language: English      needed? No    Call taken on April 16, 2019 at 4:06 PM by Marjan Yusuf

## 2019-04-23 ENCOUNTER — OFFICE VISIT (OUTPATIENT)
Dept: FAMILY MEDICINE | Facility: CLINIC | Age: 54
End: 2019-04-23
Payer: COMMERCIAL

## 2019-04-23 ENCOUNTER — RECORDS - HEALTHEAST (OUTPATIENT)
Dept: ADMINISTRATIVE | Facility: OTHER | Age: 54
End: 2019-04-23

## 2019-04-23 VITALS
HEART RATE: 62 BPM | SYSTOLIC BLOOD PRESSURE: 132 MMHG | BODY MASS INDEX: 27.58 KG/M2 | WEIGHT: 186.2 LBS | TEMPERATURE: 98 F | HEIGHT: 69 IN | DIASTOLIC BLOOD PRESSURE: 86 MMHG | RESPIRATION RATE: 18 BRPM | OXYGEN SATURATION: 99 %

## 2019-04-23 DIAGNOSIS — R05.3 PERSISTENT COUGH: ICD-10-CM

## 2019-04-23 DIAGNOSIS — R06.09 DYSPNEA ON EXERTION: Primary | ICD-10-CM

## 2019-04-23 DIAGNOSIS — D86.9 LOFGREN'S SYNDROME: ICD-10-CM

## 2019-04-23 LAB
FEF 25/75: NORMAL
FEV-1: NORMAL
FEV1/FVC: NORMAL
FVC: NORMAL

## 2019-04-23 ASSESSMENT — MIFFLIN-ST. JEOR: SCORE: 1664.98

## 2019-04-23 NOTE — PATIENT INSTRUCTIONS
Your pulomonary function shows decreased lung capacity compared to predicted, but the air flow is normal and not changed by bronchodilator.  I cannot explain all your findings.  We will enlist pulmonary medicine to give an opinion.  Recheck if worsening.

## 2019-04-23 NOTE — PROGRESS NOTES
"Pulmonary Function Testing and Medication Reconciliation    /86   Pulse 62   Temp 98  F (36.7  C) (Oral)   Resp 18   Ht 1.753 m (5' 9\")   Wt 84.5 kg (186 lb 3.2 oz)   SpO2 99%   BMI 27.50 kg/m      SUBJECTIVE:  A 54-year-old male in for check of pulmonary function testing after a chest x-ray that shows possible sarcoid.  He has a history of Jerome syndrome and it is difficult to tell from the 2 chest x-ray reports if they are changed or not.      He had a respiratory infection when he returned from [place] in January.  He has a persistent cough.  He feels like he is not getting a full breath.  He is still able to do his physical activities and was feeling more short of breath than he remembers in the past trips.  He does go about 3 or 4 times a year and it has been worse since his [place] trip.  He was also seen in [place].        He did have Jerome syndrome diagnosed about a year ago.  The problems from that have resolved.      He does not have fever or chills.  He has no history of asthma.      OBJECTIVE:   APPEARANCE:  No acute distress.   LUNGS:  Clear.        IMAGING:  Reviewed his chest  x-ray which was read as possible sarcoid.      Testing done before and after bronchodilator.  He has reduced vital capacity compared to predicted for size and age.  His flow parameters seem normal.      ASSESSMENT:   1. Dyspnea with persistent cough following respiratory infection.   2. Chest x-ray read as sarcoid.   3. History of Jerome  syndrome.      PLAN:  As this breathing problem is interfering with his activities and I do not have a good answer I am going to send him to Pulmonary Medicine to see if we can find some resolution and establish a diagnosis.      The patient involved in medical decision making throughout the visit.      Recheck as needed and followup after we have an answer from Pulmonary Medicine.       "

## 2019-04-23 NOTE — NURSING NOTE
The following nebulizer treatment was given:     MEDICATION: Albuterol Sulfate 2.5 mg  : Contix  LOT #: [id number]  EXPIRATION DATE:  08/2020  NDC # 0649-7381-64     Nebulizer Start Time:  10:05AM  Nebulizer Stop Time:  10:15AM  See Vital Signs Flowsheet

## 2019-04-24 ENCOUNTER — COMMUNICATION - HEALTHEAST (OUTPATIENT)
Dept: PULMONOLOGY | Facility: OTHER | Age: 54
End: 2019-04-24

## 2019-04-24 NOTE — RESULT ENCOUNTER NOTE
Component 1d ago  FVC             3.77  FEV-1             2.9  FEV1/FVC 0.77  FEF 25/75 2.38    Normal, but less than predicted for size and age.

## 2019-05-01 ENCOUNTER — COMMUNICATION - HEALTHEAST (OUTPATIENT)
Dept: PULMONOLOGY | Facility: OTHER | Age: 54
End: 2019-05-01

## 2019-05-21 ENCOUNTER — TRANSFERRED RECORDS (OUTPATIENT)
Dept: HEALTH INFORMATION MANAGEMENT | Facility: CLINIC | Age: 54
End: 2019-05-21

## 2019-05-22 ENCOUNTER — TRANSFERRED RECORDS (OUTPATIENT)
Dept: HEALTH INFORMATION MANAGEMENT | Facility: CLINIC | Age: 54
End: 2019-05-22

## 2019-06-03 ENCOUNTER — TRANSFERRED RECORDS (OUTPATIENT)
Dept: HEALTH INFORMATION MANAGEMENT | Facility: CLINIC | Age: 54
End: 2019-06-03

## 2019-06-10 ENCOUNTER — TELEPHONE (OUTPATIENT)
Dept: FAMILY MEDICINE | Facility: CLINIC | Age: 54
End: 2019-06-10

## 2019-06-10 NOTE — TELEPHONE ENCOUNTER
[facility] phone call message- general phone call:    Reason for call: Been seeing Alejandra Pena at [facility] in which a Pulmonary function test was done. Patient states he went out of country for a couple of weeks and felt his condition got worse. Patient states he has chest flutters and sometimes shortness of breath so he is thinking it may have to do with cardiac. Patient states he needs recommendation. Please call and advise    Return call needed: Yes    OK to leave a message on voice mail? Yes    Primary language: English      needed? No    Call taken on Huma 10, 2019 at 2:28 PM by Jean-Claude Marin

## 2019-06-10 NOTE — TELEPHONE ENCOUNTER
Called Danie Newell and scheduled him to be seen tomorrow for symptoms and results from pulmonologist. I then called [facility] at [phone]  to have them fax records.    Kaitlin Olivares, CMA

## 2019-06-27 ENCOUNTER — TRANSFERRED RECORDS (OUTPATIENT)
Dept: HEALTH INFORMATION MANAGEMENT | Facility: CLINIC | Age: 54
End: 2019-06-27

## 2019-07-02 ENCOUNTER — TRANSFERRED RECORDS (OUTPATIENT)
Dept: HEALTH INFORMATION MANAGEMENT | Facility: CLINIC | Age: 54
End: 2019-07-02

## 2019-07-05 ENCOUNTER — TRANSFERRED RECORDS (OUTPATIENT)
Dept: HEALTH INFORMATION MANAGEMENT | Facility: CLINIC | Age: 54
End: 2019-07-05

## 2019-07-18 ENCOUNTER — OFFICE VISIT (OUTPATIENT)
Dept: FAMILY MEDICINE | Facility: CLINIC | Age: 54
End: 2019-07-18
Payer: COMMERCIAL

## 2019-07-18 VITALS
DIASTOLIC BLOOD PRESSURE: 84 MMHG | OXYGEN SATURATION: 97 % | BODY MASS INDEX: 27.05 KG/M2 | RESPIRATION RATE: 16 BRPM | WEIGHT: 182.6 LBS | HEIGHT: 69 IN | SYSTOLIC BLOOD PRESSURE: 126 MMHG | HEART RATE: 62 BPM | TEMPERATURE: 98.4 F

## 2019-07-18 DIAGNOSIS — N40.1 BENIGN NON-NODULAR PROSTATIC HYPERPLASIA WITH LOWER URINARY TRACT SYMPTOMS: ICD-10-CM

## 2019-07-18 DIAGNOSIS — Z00.00 ROUTINE HISTORY AND PHYSICAL EXAMINATION OF ADULT: Primary | ICD-10-CM

## 2019-07-18 DIAGNOSIS — D86.9 LOFGREN'S SYNDROME: ICD-10-CM

## 2019-07-18 DIAGNOSIS — D86.9 SARCOIDOSIS: ICD-10-CM

## 2019-07-18 DIAGNOSIS — I89.8 CALCIFIED LYMPH NODES: ICD-10-CM

## 2019-07-18 ASSESSMENT — MIFFLIN-ST. JEOR: SCORE: 1648.65

## 2019-07-18 NOTE — PROGRESS NOTES
"Patient presents with:  Physical: No other concerns  RECHECK: F/U: Pulmonary/Cardiology Follow-up  Medication Reconciliation: Needs attention     /84   Pulse 62   Temp 98.4  F (36.9  C) (Oral)   Resp 16   Ht 1.753 m (5' 9\")   Wt 82.8 kg (182 lb 9.6 oz)   SpO2 97%   BMI 26.97 kg/m      SUBJECTIVE:  54-year-old male in for preventive health visit.  He has been seen in Pulmonary and will be seen in Cardiology for cough and increasing fatigue and increasing palpitations.  The pulmonary physician checked a repeat CT scan that showed calcified lymph nodes consistent with sarcoidosis.  Because of the increasing fatigue and what sounds like a form of angina with exercise, she also sent him to Cardiology.  His event monitor is back and showed ventricular ectopy.  The palpitations are worsening but only noticed at rest and not with exercise.  He does notice increased shortness of breath and coughing when he starts to exercise.      He still has a slower stream than he did when he was younger but it is not bothersome.  His hearing is decreased a bit.  He just had his eyes checked this week and got a clean bill of health.  He saw a dermatologist about a year ago and is planning to see a dermatologist again.  The actinic keratoses on his upper chest cleared well with the 5-FU cream treatment.      He is sleeping okay.  He is having no trouble with weight gain or problems with his diet.  He is remaining physically active and has been traveling a lot.  He has questions about travel to [facility] that he has coming up and deferred a final decision and recommendation until we see what comes from Cardiology workup.      OBJECTIVE:   APPEARANCE:  Healthy male in no apparent distress.  He does have a cough that occurs when he stands up and walks and also at rest.  It is not productive.   HEENT:  Funduscopic exam is normal.  Pupils are equal, round, reactive to light.  Extraocular movements are normal.  Throat appears clear.  He " has normal movement of the tongue.  Shoulder shrug is normal.   NECK:  No adenopathy.  Thyroid area normal.   CHEST:  Clear to auscultation.   CARDIOVASCULAR:  Regular rhythm, no S3, S4 or murmurs.   ABDOMEN:  Soft, nontender, no hepatosplenomegaly.  Bowel sounds normal, no masses palpable.   RECTAL EXAM:  Prostate may be slightly enlarged.  Central sulcus is still present.  There are no nodules.   EXTREMITIES:  No edema.   SKIN:  Appears normal.      ASSESSMENT AND PLAN:   1.  Health screening exam.   2.  Jerome syndrome.   3.  Probable sarcoidosis.   4.  Mild benign prostate hypertrophy.   5.  Fatigue with exercise, possible angina.      PLAN:  He has an appointment with Cardiology tomorrow.  We reviewed his event monitor report and it is described above.  We talked briefly about his upcoming trip to [facility] and advised he get checked in the [facility] as he probably needs yellow fever shot and that is the only place I have been  able to find carries that now.  There is also a shortage so he should schedule earlier than later.  His trip is in February.  We will base further travel recommendations following his Cardiology and Pulmonary workups.      The patient involved in medical decision making throughout the visit.      Recheck as needed or physical exam in 1 year.

## 2019-07-18 NOTE — PATIENT INSTRUCTIONS
Continue with the cardiology and pulmonary work ups.  I think you are in the right hands and in good hands.  The rest of you seems good and we will resolve the travel questions once we have completed your diagnostic work up.  I think you should set up an appointment with the [facility] travel clinic so you can get the yellow fever shot if needed (there has been a shortage and it is difficult to get).  We can touch bases by phone or mychart after your cardiology visit.  Bill

## 2019-07-19 ENCOUNTER — TRANSFERRED RECORDS (OUTPATIENT)
Dept: HEALTH INFORMATION MANAGEMENT | Facility: CLINIC | Age: 54
End: 2019-07-19

## 2019-08-12 ENCOUNTER — TRANSFERRED RECORDS (OUTPATIENT)
Dept: HEALTH INFORMATION MANAGEMENT | Facility: CLINIC | Age: 54
End: 2019-08-12

## 2019-08-14 ENCOUNTER — TRANSFERRED RECORDS (OUTPATIENT)
Dept: HEALTH INFORMATION MANAGEMENT | Facility: CLINIC | Age: 54
End: 2019-08-14

## 2019-08-14 ENCOUNTER — MEDICAL CORRESPONDENCE (OUTPATIENT)
Dept: HEALTH INFORMATION MANAGEMENT | Facility: CLINIC | Age: 54
End: 2019-08-14

## 2019-08-21 ENCOUNTER — OFFICE VISIT (OUTPATIENT)
Dept: FAMILY MEDICINE | Facility: CLINIC | Age: 54
End: 2019-08-21
Payer: COMMERCIAL

## 2019-08-21 VITALS
SYSTOLIC BLOOD PRESSURE: 116 MMHG | RESPIRATION RATE: 16 BRPM | OXYGEN SATURATION: 98 % | HEIGHT: 70 IN | HEART RATE: 58 BPM | WEIGHT: 182.4 LBS | BODY MASS INDEX: 26.11 KG/M2 | DIASTOLIC BLOOD PRESSURE: 78 MMHG | TEMPERATURE: 97.8 F

## 2019-08-21 DIAGNOSIS — D86.9 SARCOIDOSIS: ICD-10-CM

## 2019-08-21 DIAGNOSIS — Z01.818 PREOP GENERAL PHYSICAL EXAM: Primary | ICD-10-CM

## 2019-08-21 ASSESSMENT — MIFFLIN-ST. JEOR: SCORE: 1663.61

## 2019-08-21 NOTE — PATIENT INSTRUCTIONS
Preop faxed [employer] [phone]  CHEL Ruth    Before Your Surgery      Call your surgeon if there is any change in your health. This includes signs of a cold or flu (such as a sore throat, runny nose, cough, rash or fever).    Do not smoke, drink alcohol or take over the counter medicine (unless your surgeon or primary care doctor tells you to) for the 24 hours before and after surgery.    If you take prescribed drugs: Follow your doctor s orders about which medicines to take and which to stop until after surgery.    Eating and drinking prior to surgery: follow the instructions from your surgeon    Take a shower or bath the night before surgery. Use the soap your surgeon gave you to gently clean your skin. If you do not have soap from your surgeon, use your regular soap. Do not shave or scrub the surgery site.  Wear clean pajamas and have clean sheets on your bed.

## 2019-08-21 NOTE — PROGRESS NOTES
[facility] [address] [place] [address]  [phone]  Dept: [phone]    PRE-OP EVALUATION:  Today's date: 2019    Danie Newell (: 1965) presents for pre-operative evaluation assessment as requested by Dr. Meehan.  He requires evaluation and anesthesia risk assessment prior to undergoing surgery/procedure for treatment of sarcoidosis .    Proposed Surgery/ Procedure: Bronchoscopy w/ bx  Date of Surgery/ Procedure: 19  Time of Surgery/ Procedure: 7am  Hospital/Surgical Facility: [facility]   Fax number for surgical facility: [phone]  Primary Physician: Darius Rhoades  Type of Anesthesia Anticipated: General    Patient has a Health Care Directive or Living Will:  NO    1. NO - Do you have a history of heart attack, stroke, stent, bypass or surgery on an artery in the head, neck, heart or legs?  2. YES - DO YOU EVER HAVE ANY PAIN OR DISCOMFORT IN YOUR CHEST? Due to sarcoidosis -> already had an echo/ holter/ cardiac MRI/ Coronary CT/ Stress test/ Chest CT/ PET scan - all consistent w/ sarcoidosis   3. NO - Do you have a history of  Heart Failure?  4. YES - ARE YOUR TROUBLED BY SHORTNESS OF BREATH WHEN WALKING ON THE LEVEL, UP A SLIGHT HILL OR AT NIGHT? YES - see above  5. NO - Do you currently have a cold, bronchitis or other respiratory infection?  6. YES - DO YOU HAVE A COUGH, SHORTNESS OF BREATH OR WHEEZING? Cough   7. NO - Do you sometimes get pains in the calves of your legs when you walk?  8. NO - Do you or anyone in your family have previous history of blood clots?  9. NO - Do you or does anyone in your family have a serious bleeding problem such as prolonged bleeding following surgeries or cuts?  10. NO - Have you ever had problems with anemia or been told to take iron pills?  11. NO - Have you had any abnormal blood loss such as black, tarry or bloody stools, or abnormal vaginal bleeding?  12. NO - Have you ever had a blood transfusion?  13. NO -  Have you or any of your relatives ever had problems with anesthesia?  14. NO - Do you have sleep apnea, excessive snoring or daytime drowsiness?  15. NO - Do you have any prosthetic heart valves?  16. NO - Do you have prosthetic joints?  17. NO - Is there any chance that you may be pregnant?      HPI:     HPI related to upcoming procedure: bronchoscopy w/ FNA biopsy of heart and lung tissue      See problem list for active medical problems.  Problems all longstanding and stable, except as noted/documented.  See ROS for pertinent symptoms related to these conditions.      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     Priority: Medium     Benign non-nodular prostatic hyperplasia with lower urinary tract symptoms 03/01/2016     Priority: Medium     Health Care Home 03/25/2013     Priority: Medium     Tier Level: 1    DX V65.8 REPLACED WITH 82684 HEALTH CARE HOME (04/08/2013)        Past Medical History:   Diagnosis Date     AK (actinic keratosis) 6/8/2017     Benign non-nodular prostatic hyperplasia with lower urinary tract symptoms 3/1/2016     Family history of breast cancer     Mother and Grandmother     H/O skin graft 1993    fingers     H/O vasectomy      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     OTC products: none    No Known Allergies   Latex Allergy: NO    Social History     Tobacco Use     Smoking status: Never Smoker     Smokeless tobacco: Never Used   Substance Use  "Topics     Alcohol use: Yes     Alcohol/week: 0.0 oz     Comment: 3-4 drinks/week     History   Drug Use No       REVIEW OF SYSTEMS:   Constitutional, neuro, ENT, endocrine, pulmonary, cardiac, gastrointestinal, genitourinary, musculoskeletal, integument and psychiatric systems are negative, except as otherwise noted.  Pulmonary/ cardiac symptoms of cough, shortness of breath, chest tightness, palpitations consistent w/ sarcoid    EXAM:   /78   Pulse 58   Temp 97.8  F (36.6  C) (Oral)   Resp 16   Ht 1.778 m (5' 10\")   Wt 82.7 kg (182 lb 6.4 oz)   SpO2 98%   BMI 26.17 kg/m      GENERAL APPEARANCE: healthy, alert and no distress     NECK: no adenopathy, no asymmetry, masses, or scars and thyroid normal to palpation     RESP: lungs clear to auscultation - no rales, rhonchi or wheezes     CV: regular rates and rhythm, normal S1 S2, no S3 or S4 and no murmur, click or rub     MS: extremities normal- no gross deformities noted, no evidence of inflammation in joints, FROM in all extremities.     SKIN: no suspicious lesions or rashes     NEURO: Normal strength and tone, sensory exam grossly normal, mentation intact and speech normal     PSYCH: mentation appears normal. and affect normal/bright     LYMPHATICS: No cervical adenopathy      DIAGNOSTICS:     ALL necessary pulmonary and cardiac tests have been performed     Recent Labs   Lab Test 04/09/19  1101 11/22/16  1230 11/09/16  1414   HGB 15.0 15.0  --    NA  --   --  134.0   POTASSIUM  --   --  4.2   CR  --   --  1.3        IMPRESSION:   Reason for surgery/procedure: sarcoidosis - tissue bx  Diagnosis/reason for consult: pre-op clearance    The proposed surgical procedure is considered LOW risk.    REVISED CARDIAC RISK INDEX  The patient has the following serious cardiovascular risks for perioperative complications such as (MI, PE, VFib and 3  AV Block):  No serious cardiac risks  INTERPRETATION: 0 risks: Class I (very low risk - 0.4% complication " rate)    The patient has the following additional risks for perioperative complications:  No identified additional risks      ICD-10-CM    1. Preop general physical exam Z01.818        RECOMMENDATIONS:         --Patient is on no chronic medications    APPROVAL GIVEN to proceed with proposed procedure, without further diagnostic evaluation       (Z01.818) Preop general physical exam  (primary encounter diagnosis)  Comment:   Plan: pt already w/ extensive workup prior to upcoming bronchoscopy; pt cleared to proceed w/ low risk procedure w/o additional testing    (D86.9) Sarcoidosis  Comment:   Plan: see above    Den Richardson MD  August 21, 2019  1:47 PM    Signed Electronically by: Den Richardson MD    Copy of this evaluation report is provided to requesting physician.

## 2019-09-17 ENCOUNTER — TELEPHONE (OUTPATIENT)
Dept: FAMILY MEDICINE | Facility: CLINIC | Age: 54
End: 2019-09-17

## 2019-09-17 NOTE — TELEPHONE ENCOUNTER
Dr Kaveh Paez called from [facility] to inform Dr Rhoades, Jani has been given diagnose of Cardiac Sarcoidosis. Would like to inform Dr Rhoades ahead to expect a call from his nurses, with information regarding immunizations that Jani will need. Further questions or if would like to discuss in detail, please call [phone]. Dr Paez has been informed, Dr Rhoades will not be expected in clinic today. Will return tomorrow afternoon in clinic.  Dr Paez is fine with this time interval.  Christian GUTHRIE

## 2019-09-20 PROBLEM — D86.85 CARDIAC SARCOIDOSIS: Status: ACTIVE | Noted: 2019-08-14

## 2019-09-20 NOTE — TELEPHONE ENCOUNTER
Discussed with Dr Kaveh Paez - [facility].  Probable cardiac sarcoidosis  PET scan positive for sarcoid and involves the RV.  Jani will be seen by EP cardiologist.  Possible candidate for ICD.  May need antiarrhythmic medications.  His baseline cardiac function is good.  Plan to start on prednesone (high dose) and methotrexate.  Should have pneumonia, influenza, and shingles vaccines prior to start.    WR

## 2019-09-20 NOTE — TELEPHONE ENCOUNTER
Kaveh states he received a call from Dr. Rhoades yesterday. Notified that Dr. Rhoades is not in clinic today, caller understood and states he will also try his cell phone number since it was left in a voicemail.

## 2019-09-24 ENCOUNTER — TELEPHONE (OUTPATIENT)
Dept: FAMILY MEDICINE | Facility: CLINIC | Age: 54
End: 2019-09-24

## 2019-09-24 NOTE — TELEPHONE ENCOUNTER
Discussed immunizations  Needs flu, pneumonia, and shingles.  Get at travel clinic or pharmacy as soon as he can.  WR

## 2019-09-24 NOTE — TELEPHONE ENCOUNTER
Vaccination record printed by Dr Rhoades and I have mailed this record to patient's home address. RLtimmy GUTHRIE

## 2019-10-24 ENCOUNTER — TELEPHONE (OUTPATIENT)
Dept: FAMILY MEDICINE | Facility: CLINIC | Age: 54
End: 2019-10-24

## 2019-10-24 NOTE — TELEPHONE ENCOUNTER
[facility] phone call message- general phone call:    Reason for call: Patient states [facility] stated they faxed a standing lab order to [facility] to have labs done every month. Patient wants to verify if order was received. Please call and advise.     Return call needed: Yes    OK to leave a message on voice mail? Yes    Primary language: English      needed? No    Call taken on October 24, 2019 at 12:55 PM by Marjan Yusuf

## 2019-10-24 NOTE — TELEPHONE ENCOUNTER
Called Danie back, reviewed chart, no order yet for monthly blood work. Danie wanted to check in to ensure communication between primary clinic and [facility] was carried out well. Plan- he will be starting immunosuppressant treatment 10/29/2019 and wishes to come into [facility] for monthly lab draws. Danie will be following up with Dr Rhoades on 11/5/2019 but wanted to call ahead to relay this information to expect orders from [facility] if it has not already arrived. [facility] orders to my attn. No further action is needed at this time. Christian GUTHRIE

## 2019-10-29 ENCOUNTER — MEDICAL CORRESPONDENCE (OUTPATIENT)
Dept: HEALTH INFORMATION MANAGEMENT | Facility: CLINIC | Age: 54
End: 2019-10-29

## 2019-10-30 DIAGNOSIS — Z79.52 LONG TERM CURRENT USE OF SYSTEMIC STEROIDS: Primary | ICD-10-CM

## 2019-10-30 DIAGNOSIS — Z79.52 LONG TERM CURRENT USE OF SYSTEMIC STEROIDS: ICD-10-CM

## 2019-10-30 DIAGNOSIS — D86.85 SARCOID MYOCARDITIS: Primary | ICD-10-CM

## 2019-10-30 DIAGNOSIS — D86.85 SARCOID MYOCARDITIS: ICD-10-CM

## 2019-11-05 ENCOUNTER — OFFICE VISIT (OUTPATIENT)
Dept: FAMILY MEDICINE | Facility: CLINIC | Age: 54
End: 2019-11-05
Payer: COMMERCIAL

## 2019-11-05 VITALS
HEART RATE: 57 BPM | WEIGHT: 179 LBS | RESPIRATION RATE: 16 BRPM | OXYGEN SATURATION: 98 % | HEIGHT: 70 IN | TEMPERATURE: 97.5 F | BODY MASS INDEX: 25.62 KG/M2 | DIASTOLIC BLOOD PRESSURE: 86 MMHG | SYSTOLIC BLOOD PRESSURE: 129 MMHG

## 2019-11-05 DIAGNOSIS — D86.85 CARDIAC SARCOIDOSIS: Primary | ICD-10-CM

## 2019-11-05 DIAGNOSIS — K21.9 GASTROESOPHAGEAL REFLUX DISEASE WITHOUT ESOPHAGITIS: ICD-10-CM

## 2019-11-05 DIAGNOSIS — Z95.810 PRESENCE OF AUTOMATIC (IMPLANTABLE) CARDIAC DEFIBRILLATOR: ICD-10-CM

## 2019-11-05 PROBLEM — I49.9 ARRHYTHMIA, VENTRICULAR: Status: ACTIVE | Noted: 2019-10-15

## 2019-11-05 RX ORDER — AMIODARONE HYDROCHLORIDE 200 MG/1
200 TABLET ORAL DAILY
Qty: 30 TABLET | Refills: 3 | COMMUNITY
Start: 2019-11-05 | End: 2020-07-20 | Stop reason: ALTCHOICE

## 2019-11-05 RX ORDER — PREDNISONE 10 MG/1
30 TABLET ORAL DAILY
COMMUNITY
Start: 2019-10-25 | End: 2020-06-30 | Stop reason: DRUGHIGH

## 2019-11-05 RX ORDER — ATOVAQUONE 750 MG/5ML
750 SUSPENSION ORAL 2 TIMES DAILY
Qty: 300 ML | Refills: 3 | Status: SHIPPED | OUTPATIENT
Start: 2019-11-05 | End: 2019-12-05

## 2019-11-05 ASSESSMENT — MIFFLIN-ST. JEOR: SCORE: 1643.19

## 2019-11-05 NOTE — PROGRESS NOTES
"Patient presents with:  Surgical Followup: post op srugery on heart  Medication Reconciliation    /86   Pulse 57   Temp 97.5  F (36.4  C) (Oral)   Resp 16   Ht 1.77 m (5' 9.69\")   Wt 81.2 kg (179 lb)   SpO2 98%   BMI 25.92 kg/m      SUBJECTIVE:  A 54-year-old male who carries a diagnosis of cardiac sarcoidosis.  He is being managed by the group at [facility] and has a pretty well defined using methotrexate and newer DMARD to suppress the immune response and amiodarone for cardiac control.  He is also on prednisone on a tapering dose.  We talked about his return to exercise and protecting the ICD.  He has his ICD set at 185 beats per minute.  He probably has some stress testing coming up in January and that may give him a better idea of his maximum heart rate is.  He has been trying to keep it at 110 or lower recently and would like to start to increase his activity.  His weight is starting to go up with decreased activity.      His goal is to return to downhill skiing.      His symptoms are otherwise fairly well controlled as he is on medications and used to them he is not had any dysrhythmias.      OBJECTIVE:   APPEARANCE:  No acute distress.   LUNGS:  Clear.   CARDIOVASCULAR:  Regular rhythm.  ICD scar well healed.   EXTREMITIES:  No edema.      ASSESSMENT:   1. Cardiac sarcoidosis.   2. Immunosuppression therapy.   3. ICD in place.   4. GERD.      PLAN:  We will continue current medications.  We talked about returning to exercise.  We talked about some strategies for maintaining and increasing his strength and activity and will follow up based on the plan from [facility].      We will add checking labs for him on an intermittent basis between his visits at [facility].      The patient involved in medical decision making throughout the visit.      Continue current medications, updated his medication list and will refill meds as needed.      Recheck 3 months, earlier if problems.         "

## 2019-11-05 NOTE — PATIENT INSTRUCTIONS
Continue your meds.  Check with Daisetta regarding pushups and heart rate max.  Will get labs set up as needed.

## 2019-11-26 DIAGNOSIS — D86.85 SARCOID MYOCARDITIS: ICD-10-CM

## 2019-11-26 DIAGNOSIS — Z79.52 LONG TERM CURRENT USE OF SYSTEMIC STEROIDS: ICD-10-CM

## 2019-11-26 LAB
ALT SERPL W/O P-5'-P-CCNC: 25 U/L (ref 0–45)
AST SERPL-CCNC: 26 U/L (ref 0–40)
BASOPHILS # BLD AUTO: 0 THOU/UL (ref 0–0.2)
BASOPHILS NFR BLD AUTO: 0 % (ref 0–2)
CREAT SERPL-MCNC: 1.1 MG/DL (ref 0.7–1.3)
EOSINOPHIL # BLD AUTO: 0.1 THOU/UL (ref 0–0.4)
EOSINOPHIL NFR BLD AUTO: 1 % (ref 0–6)
ERYTHROCYTE [DISTWIDTH] IN BLOOD BY AUTOMATED COUNT: 13.5 % (ref 11–14.5)
FASTING?: YES
GLUCOSE SERPL-MCNC: 91 MG/DL (ref 70–125)
HCT VFR BLD AUTO: 44.7 % (ref 40–54)
HGB BLD-MCNC: 14.5 G/DL (ref 14–18)
LYMPHOCYTES # BLD AUTO: 1.1 THOU/UL (ref 0.8–4.4)
LYMPHOCYTES NFR BLD AUTO: 21 % (ref 20–40)
MCH RBC QN AUTO: 30.5 PG (ref 27–34)
MCHC RBC AUTO-ENTMCNC: 32.4 G/DL (ref 32–36)
MCV RBC AUTO: 94 FL (ref 80–100)
MONOCYTES # BLD AUTO: 0.7 THOU/UL (ref 0–0.9)
MONOCYTES NFR BLD AUTO: 13 % (ref 2–10)
NEUTROPHILS # BLD AUTO: 3.4 THOU/UL (ref 2–7.7)
NEUTROPHILS NFR BLD AUTO: 65 % (ref 50–70)
PLATELET # BLD AUTO: 198 THOU/UL (ref 140–440)
PMV BLD AUTO: 10.6 FL (ref 8.5–12.5)
RBC # BLD AUTO: 4.76 MILL/UL (ref 4.4–6.2)
WBC # BLD AUTO: 5.3 THOU/UL (ref 4–11)

## 2019-12-23 DIAGNOSIS — Z79.52 LONG TERM CURRENT USE OF SYSTEMIC STEROIDS: ICD-10-CM

## 2019-12-23 DIAGNOSIS — D86.85 SARCOID MYOCARDITIS: ICD-10-CM

## 2019-12-23 LAB
ALT SERPL W/O P-5'-P-CCNC: 22 U/L (ref 0–45)
AST SERPL-CCNC: 14 U/L (ref 0–40)
BASOPHILS # BLD AUTO: 0 THOU/UL (ref 0–0.2)
BASOPHILS NFR BLD AUTO: 0 % (ref 0–2)
CREAT SERPL-MCNC: 1.12 MG/DL (ref 0.7–1.3)
EOSINOPHIL # BLD AUTO: 0 THOU/UL (ref 0–0.4)
EOSINOPHIL NFR BLD AUTO: 0 % (ref 0–6)
ERYTHROCYTE [DISTWIDTH] IN BLOOD BY AUTOMATED COUNT: 13.9 % (ref 11–14.5)
FASTING?: YES
GLUCOSE SERPL-MCNC: 110 MG/DL (ref 70–125)
HCT VFR BLD AUTO: 45.8 % (ref 40–54)
HGB BLD-MCNC: 15.2 G/DL (ref 14–18)
LYMPHOCYTES # BLD AUTO: 0.4 THOU/UL (ref 0.8–4.4)
LYMPHOCYTES NFR BLD AUTO: 6 % (ref 20–40)
MCH RBC QN AUTO: 30.6 PG (ref 27–34)
MCHC RBC AUTO-ENTMCNC: 33.2 G/DL (ref 32–36)
MCV RBC AUTO: 92 FL (ref 80–100)
MONOCYTES # BLD AUTO: 0.3 THOU/UL (ref 0–0.9)
MONOCYTES NFR BLD AUTO: 4 % (ref 2–10)
NEUTROPHILS # BLD AUTO: 5.8 THOU/UL (ref 2–7.7)
NEUTROPHILS NFR BLD AUTO: 90 % (ref 50–70)
PLATELET # BLD AUTO: 235 THOU/UL (ref 140–440)
PMV BLD AUTO: 10.4 FL (ref 8.5–12.5)
RBC # BLD AUTO: 4.97 MILL/UL (ref 4.4–6.2)
WBC # BLD AUTO: 6.4 THOU/UL (ref 4–11)

## 2019-12-24 NOTE — RESULT ENCOUNTER NOTE
Jani,  Your labs are all ok.  Liver and kidney function is normal range.  White and red cell counts in goal range.  Happy Holidays.  Shabbir

## 2020-03-10 ENCOUNTER — NURSE TRIAGE (OUTPATIENT)
Dept: FAMILY MEDICINE | Facility: CLINIC | Age: 55
End: 2020-03-10

## 2020-03-10 ENCOUNTER — OFFICE VISIT - HEALTHEAST (OUTPATIENT)
Dept: FAMILY MEDICINE | Facility: CLINIC | Age: 55
End: 2020-03-10

## 2020-03-10 DIAGNOSIS — R05.9 COUGH: ICD-10-CM

## 2020-03-10 DIAGNOSIS — R50.9 FEVER, UNSPECIFIED FEVER CAUSE: ICD-10-CM

## 2020-03-10 LAB
FLUAV AG SPEC QL IA: NORMAL
FLUBV AG SPEC QL IA: NORMAL

## 2020-03-10 NOTE — TELEPHONE ENCOUNTER
"    Additional Information    Negative: Difficult to awaken or acting confused (e.g., disoriented, slurred speech)    Negative: Pale cold skin and very weak (can't stand)    Negative: Difficulty breathing and bluish (or gray) lips or face    Negative: New onset rash with purple (or blood-colored) spots or dots    Negative: Sounds like a life-threatening emergency to the triager    Negative: Fever onset within 24 hours of receiving vaccine    Negative: Fever within 21 days of Ebola EXPOSURE    Negative: Headache and stiff neck (can't touch chin to chest)    Negative: Difficulty breathing    Negative: IV drug abuse    Negative: Fever > 103 F (39.4 C)    Negative: Fever > 101 F (38.3 C) and over 60 years of age    Fever > 100.0 F (37.8 C) and diabetes mellitus or a weak immune system (e.g., HIV positive, chemotherapy, splenectomy)    Answer Assessment - Initial Assessment Questions  1. TEMPERATURE: \"What is the most recent temperature?\"  \"How was it measured?\"       102.2 oral  2. ONSET: \"When did the fever start?\"       3/9/2020  3. SYMPTOMS: \"Do you have any other symptoms besides the fever?\"  (e.g., colds, headache, sore throat, earache, cough, rash, diarrhea, vomiting, abdominal pain)      Rhinorrhea, cough, sneezing and muscle achiness  4. CAUSE: recent travel to [place] about 5 weeks ago and just over past weekend to [place], [place]. Returned to [place] 3/9/2020.          5. CONTACTS: has been on multiple plane rides.       6. TREATMENT: \"What have you done so far to treat this fever?\" (e.g., medications)      Taken Ibuprofen  7. IMMUNOCOMPROMISE: \"Do you have of the following: diabetes, HIV positive, splenectomy, cancer chemotherapy, chronic steroid treatment, transplant patient, etc.\"      Sarcoid Myocarditis  8. PREGNANCY: \"Is there any chance you are pregnant?\" \"When was your last menstrual period?\"      No  9. TRAVEL: \"Have you traveled out of the country in the last month?\" (e.g., travel history, " exposures)      5 weeks ago to [place]    Protocols used: FEVER-A-[place]

## 2020-03-10 NOTE — TELEPHONE ENCOUNTER
[facility] Given [facility] location information. RLtimmy RN

## 2020-03-11 ENCOUNTER — COMMUNICATION - HEALTHEAST (OUTPATIENT)
Dept: SCHEDULING | Facility: CLINIC | Age: 55
End: 2020-03-11

## 2020-03-11 ENCOUNTER — COMMUNICATION - HEALTHEAST (OUTPATIENT)
Dept: FAMILY MEDICINE | Facility: CLINIC | Age: 55
End: 2020-03-11

## 2020-03-12 LAB
COVID-19 VIRUS PCR RESULT FROM MDH: NORMAL
SPECIMEN STATUS: NORMAL

## 2020-03-18 ENCOUNTER — MYC MEDICAL ADVICE (OUTPATIENT)
Dept: FAMILY MEDICINE | Facility: CLINIC | Age: 55
End: 2020-03-18

## 2020-03-18 LAB — SPECIMEN STATUS: NORMAL

## 2020-03-18 NOTE — TELEPHONE ENCOUNTER
Jani,  Thanks for updating me. I heard about the positive test on Monday.  I have been out of the office for family issues.  I am really happy to hear you are recovering.  I am sure this was a shock for you and family.  Get well quickly and keep me posted on your progress.  Shabbir

## 2020-03-27 NOTE — PROGRESS NOTES
Updated med list.  Had PE over weekend and admitted at [facility].  Home now and doing well.  WR

## 2020-04-07 ENCOUNTER — MYC MEDICAL ADVICE (OUTPATIENT)
Dept: FAMILY MEDICINE | Facility: CLINIC | Age: 55
End: 2020-04-07

## 2020-04-08 NOTE — TELEPHONE ENCOUNTER
Discussed CT and PE.  Will plan a 6 month course of anticoagulation unless cardiologist from [place] suggests better alternative.  WR

## 2020-05-15 ENCOUNTER — MEDICAL CORRESPONDENCE (OUTPATIENT)
Dept: HEALTH INFORMATION MANAGEMENT | Facility: CLINIC | Age: 55
End: 2020-05-15
Payer: COMMERCIAL

## 2020-05-15 DIAGNOSIS — Z79.52 LONG TERM CURRENT USE OF SYSTEMIC STEROIDS: ICD-10-CM

## 2020-05-15 DIAGNOSIS — D86.85 SARCOID MYOCARDITIS: Primary | ICD-10-CM

## 2020-06-30 ENCOUNTER — MYC MEDICAL ADVICE (OUTPATIENT)
Dept: FAMILY MEDICINE | Facility: CLINIC | Age: 55
End: 2020-06-30

## 2020-06-30 DIAGNOSIS — I26.99 PULMONARY EMBOLISM, UNSPECIFIED CHRONICITY, UNSPECIFIED PULMONARY EMBOLISM TYPE, UNSPECIFIED WHETHER ACUTE COR PULMONALE PRESENT (H): ICD-10-CM

## 2020-06-30 DIAGNOSIS — D86.9 SARCOIDOSIS: Primary | ICD-10-CM

## 2020-06-30 RX ORDER — FOLIC ACID 1 MG/1
1 TABLET ORAL DAILY
COMMUNITY
Start: 2020-04-23

## 2020-06-30 RX ORDER — PREDNISONE 1 MG/1
1 TABLET ORAL DAILY
COMMUNITY
Start: 2020-06-23 | End: 2020-07-20

## 2020-07-20 ENCOUNTER — OFFICE VISIT (OUTPATIENT)
Dept: FAMILY MEDICINE | Facility: CLINIC | Age: 55
End: 2020-07-20
Payer: COMMERCIAL

## 2020-07-20 ENCOUNTER — OFFICE VISIT (OUTPATIENT)
Dept: PHARMACY | Facility: PHYSICIAN GROUP | Age: 55
End: 2020-07-20
Payer: COMMERCIAL

## 2020-07-20 VITALS
BODY MASS INDEX: 25.22 KG/M2 | SYSTOLIC BLOOD PRESSURE: 121 MMHG | TEMPERATURE: 98.1 F | OXYGEN SATURATION: 98 % | WEIGHT: 176.2 LBS | RESPIRATION RATE: 16 BRPM | HEART RATE: 55 BPM | HEIGHT: 70 IN | DIASTOLIC BLOOD PRESSURE: 78 MMHG

## 2020-07-20 VITALS
HEIGHT: 70 IN | RESPIRATION RATE: 16 BRPM | SYSTOLIC BLOOD PRESSURE: 121 MMHG | WEIGHT: 176.2 LBS | HEART RATE: 55 BPM | OXYGEN SATURATION: 98 % | TEMPERATURE: 98.1 F | BODY MASS INDEX: 25.22 KG/M2 | DIASTOLIC BLOOD PRESSURE: 78 MMHG

## 2020-07-20 DIAGNOSIS — D86.85 CARDIAC SARCOIDOSIS: ICD-10-CM

## 2020-07-20 DIAGNOSIS — I49.9 ARRHYTHMIA, VENTRICULAR: Primary | ICD-10-CM

## 2020-07-20 DIAGNOSIS — D86.9 SARCOIDOSIS: Primary | ICD-10-CM

## 2020-07-20 DIAGNOSIS — Z91.89 AT RISK FOR PROLONGED QT INTERVAL SYNDROME: ICD-10-CM

## 2020-07-20 DIAGNOSIS — Z86.711 HISTORY OF PULMONARY EMBOLISM: ICD-10-CM

## 2020-07-20 PROCEDURE — 99605 MTMS BY PHARM NP 15 MIN: CPT | Performed by: PHARMACIST

## 2020-07-20 PROCEDURE — 99606 MTMS BY PHARM EST 15 MIN: CPT | Performed by: PHARMACIST

## 2020-07-20 RX ORDER — SOTALOL HYDROCHLORIDE 120 MG/1
120 TABLET ORAL 2 TIMES DAILY
COMMUNITY
Start: 2020-07-20 | End: 2021-07-29

## 2020-07-20 RX ORDER — PREDNISONE 1 MG/1
TABLET ORAL
COMMUNITY
Start: 2020-07-20 | End: 2022-04-28

## 2020-07-20 ASSESSMENT — MIFFLIN-ST. JEOR
SCORE: 1625.49
SCORE: 1625.49

## 2020-07-20 NOTE — PROGRESS NOTES
Preceptor Attestation:  Patient seen, examined, and discussed with the resident..  I agree with written assessment and plan of care.  Supervising Physician:  Noreen Pena MD  [facility]

## 2020-07-20 NOTE — PATIENT INSTRUCTIONS
For future, if you have concerns about the cost of your Eliquis, call the [facility] Pres[facility] Assistance Program: [phone]    For future, you can purchase the Omeprazole over the counter at [facility] without a prescription. This may help to decrease cost.     You can dispose of your medicines at the [facility] for free. As the drop box is outside, you can bring them any time of the day. Their address:   [address] [place] [address], [id number]

## 2020-07-20 NOTE — PROGRESS NOTES
HPI       Danie Newell is a 55 year old  male  who presents for follow up of concern(s) listed below:    Chief Complaint   Patient presents with     Hospital F/U     [facility] sotalol loading medication feeling tired 7/13/20     Medication Reconciliation     pharmD to discuss      Mr. Newell was hospitalized on 7/13/2020 at [facility] for sotalol loading. He reports he did experience some hypotension, but otherwise tolerated the starting of the medication well. He feels relatively at baseline, does note that there are less times he feels palpitations or like his heart is skipping beats. Denies chest pain, worsening shortness of breath, lightheadedness, dizziness, syncope.     Of note, he had noticed a soreness just distal to his IV placement. His IV was pulled on Wednesday of last week, noticed increased soreness on Friday. He has been using hot packs, which does palliate it some. It is mostly sore to palpation, denies severe pain.       Patient Active Problem List    Diagnosis Date Noted     At risk for prolonged QT interval syndrome 07/20/2020     Priority: Medium     On sotalol 7/2020       Presence of automatic (implantable) cardiac defibrillator 10/15/2019     Priority: Medium     Status post dual-chamber ICD implant October 15, 2019.       Arrhythmia, ventricular 10/15/2019     Priority: Medium     Status post electrophysiology study October 15, 2019 with nonsustained ventricular tachycardia and ventricular flutter induced.       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     Priority: Medium     Benign prostatic hyperplasia with urinary obstruction 03/01/2016      "Priority: Medium     Health Care Home 03/25/2013     Priority: Medium     Tier Level: 1    DX V65.8 REPLACED WITH 30300 HEALTH CARE HOME (04/08/2013)         apixaban ANTICOAGULANT (ELIQUIS) 5 MG tablet, Take 1 tablet (5 mg) by mouth 2 times daily  calcium citrate-vitamin D (CITRACAL) 315-250 MG-UNIT TABS per tablet, Take 1 tablet by mouth daily  cholecalciferol (VITAMIN D-1000 MAX ST) 25 MCG (1000 UT) TABS, Take 1,000 Units by mouth daily  folic acid (FOLVITE) 1 MG tablet, Take 1 mg by mouth daily  methotrexate 2.5 MG tablet, Take 8 tablets (20 mg) by mouth once a week  omeprazole (PRILOSEC) 20 MG DR capsule, Take 1 capsule (20 mg) by mouth daily    No current facility-administered medications on file prior to visit.        No Known Allergies         Review of Systems:   General: Reports general well health, denies lightheadedness, dizziness, headaches.  Pulm: Denies worsening WILLIAM, SOB  CV: Denies chest pain, palpitations  GI: Denies change in appetite  Extremities: Mild swelling in distal L forearm with increased soreness to palpation  12 point ROS is otherwise negative.            Physical Exam:     Vitals:    07/20/20 1337   BP: 121/78   Pulse: 55   Resp: 16   Temp: 98.1  F (36.7  C)   TempSrc: Oral   SpO2: 98%   Weight: 79.9 kg (176 lb 3.2 oz)   Height: 1.77 m (5' 9.69\")     Body mass index is 25.51 kg/m .  Vitals were reviewed and were normal    GENERAL: healthy, alert, well nourished, well hydrated, no distress  NECK: no tenderness, no adenopathy, no asymmetry, no masses, no stiffness; thyroid- normal to palpation  RESP: lungs clear to auscultation - no rales, no rhonchi, no wheezes  CV: regular rates and rhythm, normal S1 S2, no S3 or S4 and no murmur, no click or rub -  MS: extremities- mild tenderness to palpation over L lateral ventral forearm, overlying skin is mildly erythematous without obvious edema or hematoma palpated, otherwise no gross deformities noted, no edema      Results:      Results from " the last 24 hoursNo results found for this or any previous visit (from the past 24 hour(s)).   Due for lab work in September, per [place]    Assessment and Plan   Danie Newell is a 54 y/o with PMHx significant for sarcoidosis, involving his heart and joints, ICD placement 2019, who was recently started on sotalol for rate and rhythm control at [facility] at [place]. He presents today for follow up after hospitalization for loading of sotalol.    1. Hospitalization follow up  L arm swelling  S/p sotalol loading at [place]. Is vitally normal at clinic follow up. Arm appears to be possible adhesive reaction vs small hematoma from IV placement. Low suspicion for blood clot as patient is on apixaban.  -Heat pack for arm as needed  -Continue on sotalol   -Follow up for annual physical with Dr. Rhoades in September  -Follow up with [place] in December    2. Increased risk for QT prolongation syndrome  Sotalol is QT prolonging drug. Does not currently have prolonged QT.  -Updated problem list, recommendation to not start any other QT prolonging agents    3. Medication Reconciliation  Conducted by Dr. Rasmussen  -Consider discontinuing anticoagulation in September, 6 months s/p provoked PE due to COVID.      Options for treatment and follow-up care were reviewed with the patient. Danie Newell  engaged in the decision making process and verbalized understanding of the options discussed and agreed with the final plan.    Will follow up in September for annual physical with Dr. Rhoades and labs work.    Precepted with Dr. Jean Fowler MD

## 2020-07-20 NOTE — PROGRESS NOTES
"[facility] ENCOUNTER  SUBJECTIVE/OBJECTIVE:                           Danie Newell is a 55 year old male coming in for a transitions of care visit. He was discharged from [facility] on 7/15/2020 for sotalol loading for inducible ventricular tachycardia/fibrillation. Today's visit is a co-visit with Dr Fowler. Notable recent history for COVID 3/11/2020 and subsequent diagnosis of bilateral submassive PE, discharged from [facility] on 3/26/20.    Inducible VT: No concerns for dizziness / lightheadedness that's different than what he is used to. No concerns for cost currently with Sotalol. Discontinued Amiodarone, has supply left over.   Provoked PE: Anticoagulation 6 months at least per Dr. Zamora or per Cardiology recommendation. Using coupon for medication, currently no concerns for cost of medication.   Cardiac sarcoidosis: Prednisone 9 mg daily. Decrease 1 mg/month until reach 5 mg/day. Stay at that. Cards at [facility], due for visit December 2020. Questions about Omeprazole and why he is on this.     Today's Vitals:   BP Readings from Last 1 Encounters:   07/20/20 121/78     Pulse Readings from Last 1 Encounters:   07/20/20 55     Wt Readings from Last 1 Encounters:   07/20/20 79.9 kg (176 lb 3.2 oz)     Ht Readings from Last 1 Encounters:   07/20/20 1.77 m (5' 9.69\")     Estimated body mass index is 25.51 kg/m  as calculated from the following:    Height as of this encounter: 1.77 m (5' 9.69\").    Weight as of this encounter: 79.9 kg (176 lb 3.2 oz).    Temp Readings from Last 1 Encounters:   07/20/20 98.1  F (36.7  C) (Oral)       ASSESSMENT:                            Inducible VT: Controlled. Ongoing monitoring / mgmt per specialist.   Provoked PE: Controlled. Currently not a lot of data about anticoagualtion duration with clot 2/2 COVID. Currently has completed 3 months of anticoag which is typical duration for provoked PE.   Cardiac sarcoidosis: Controlled. PPI likely for primary GI ppx for ongoing steroid use. "     PLAN:                          Post Discharge Medication Reconciliation Status: discharge medications reconciled, continue medications without change.    1. For future, if you have concerns about the cost of your Eliquis, call the [place] Prescription Assistance Program: [phone]  2. For future, you can purchase the Omeprazole over the counter at [facility] without a prescription. This may help to decrease cost. Educated patient about the use of this medicine likely as ulcer prevention.   3. You can dispose of your medicines at the [employer] [facility] [employer] [facility] for free. As the drop box is outside, you can bring them any time of the day. Their address:   [address] [place] [address], [address]  4. Encouraged patient to connect with Dr. Rhoades or specialist September 2020 (6 months) to determine anticoagulation plan. Patient will likely plan for annual physical around this time.     I spent 30 minutes with this patient today. Dr. Miller was provided the recommendations above  in clinic today and Dr. Pena is the authorizing prescriber for this visit through the pharmacist collaborative practice agreement.. A copy of the visit note was provided to the patient's primary care provider.    Will follow up in PRN per patient or provider request.    The patient was given a summary of these recommendations. See Provider note/AVS from today.     Jo Singh, PharmD, CDCES (previously, CDE)  [facility]  Phone: [phone]  July 21, 2020 at 12:07 PM

## 2020-07-20 NOTE — Clinical Note
Hello! We talked about following up with you in September to talk more about anticoagulation duration. I also encouraged him to send a message to his cardiologist around this time to get their input.

## 2020-07-21 PROBLEM — Z86.711 HISTORY OF PULMONARY EMBOLISM: Status: ACTIVE | Noted: 2020-07-21

## 2020-09-16 DIAGNOSIS — D86.85 SARCOID MYOCARDITIS: ICD-10-CM

## 2020-09-16 DIAGNOSIS — Z79.52 LONG TERM CURRENT USE OF SYSTEMIC STEROIDS: ICD-10-CM

## 2020-09-16 LAB
ALT SERPL W/O P-5'-P-CCNC: 41 U/L (ref 0–45)
AST SERPL-CCNC: 22 U/L (ref 0–40)
CREAT SERPL-MCNC: 1 MG/DL (ref 0.7–1.3)
ERYTHROCYTE [DISTWIDTH] IN BLOOD BY AUTOMATED COUNT: 14.1 % (ref 11–14.5)
GLUCOSE P FAST SERPL-MCNC: 128 MG/DL (ref 51–109)
HCT VFR BLD AUTO: 43.6 % (ref 40–54)
HGB BLD-MCNC: 14.7 G/DL (ref 14–18)
MCH RBC QN AUTO: 31.3 PG (ref 27–34)
MCHC RBC AUTO-ENTMCNC: 33.7 G/DL (ref 32–36)
MCV RBC AUTO: 93 FL (ref 80–100)
PLATELET # BLD AUTO: 299 THOU/UL (ref 140–440)
PMV BLD AUTO: 10.4 FL (ref 8.5–12.5)
RBC # BLD AUTO: 4.69 MILL/UL (ref 4.4–6.2)
WBC # BLD AUTO: 7.6 THOU/UL (ref 4–11)

## 2020-09-17 NOTE — RESULT ENCOUNTER NOTE
Jani,  Your kidneys, liver, and blood count testing are all normal.  Your glucose is a bit elevated; that could be from the prednesone or you may not have been fasting for the blood draw.  If fasting, decrease your meal portions a bit and try to get some exercise every day.  Hope you are doing well.  Shabbir

## 2020-12-14 ENCOUNTER — HEALTH MAINTENANCE LETTER (OUTPATIENT)
Age: 55
End: 2020-12-14

## 2020-12-29 DIAGNOSIS — Z79.52 LONG TERM CURRENT USE OF SYSTEMIC STEROIDS: ICD-10-CM

## 2020-12-29 DIAGNOSIS — D86.85 SARCOID MYOCARDITIS: ICD-10-CM

## 2020-12-29 LAB
% IMMATURE GRANULOCYTES: 0 %
ABS IMMATURE GRANULOCYTES: 0 THOU/UL
ALT SERPL W/O P-5'-P-CCNC: 50 U/L (ref 0–45)
AST SERPL-CCNC: 34 U/L (ref 0–40)
BASOPHILS # BLD AUTO: 0.1 THOU/UL (ref 0–0.2)
BASOPHILS NFR BLD AUTO: 1 % (ref 0–2)
CREAT SERPL-MCNC: 0.84 MG/DL (ref 0.7–1.3)
EOSINOPHIL # BLD AUTO: 0.2 THOU/UL (ref 0–0.4)
EOSINOPHIL NFR BLD AUTO: 3 % (ref 0–6)
ERYTHROCYTE [DISTWIDTH] IN BLOOD BY AUTOMATED COUNT: 12.8 % (ref 11–14.5)
FASTING?: YES
GLUCOSE SERPL-MCNC: 88 MG/DL (ref 70–125)
HCT VFR BLD AUTO: 44.7 % (ref 40–54)
HGB BLD-MCNC: 15 G/DL (ref 14–18)
LYMPHOCYTES # BLD AUTO: 1.2 THOU/UL (ref 0.8–4.4)
LYMPHOCYTES NFR BLD AUTO: 21 % (ref 20–40)
MCH RBC QN AUTO: 31.2 PG (ref 27–34)
MCHC RBC AUTO-ENTMCNC: 33.6 G/DL (ref 32–36)
MCV RBC AUTO: 93 FL (ref 80–100)
MONOCYTES # BLD AUTO: 0.8 THOU/UL (ref 0–0.9)
MONOCYTES NFR BLD AUTO: 14 % (ref 2–10)
NEUTROPHILS # BLD AUTO: 3.3 THOU/UL (ref 2–7.7)
NEUTROPHILS NFR BLD AUTO: 60 % (ref 50–70)
PLATELET # BLD AUTO: 234 THOU/UL (ref 140–440)
PMV BLD AUTO: 10.4 FL (ref 8.5–12.5)
RBC # BLD AUTO: 4.81 MILL/UL (ref 4.4–6.2)
WBC # BLD AUTO: 5.5 THOU/UL (ref 4–11)

## 2020-12-29 PROCEDURE — 36415 COLL VENOUS BLD VENIPUNCTURE: CPT

## 2020-12-29 NOTE — LETTER
December 30, 2020      Danie Newell  [address] [id number]        Dear ,    We are writing to inform you of your test results.    Your labs are back and look good.   You do have a slight rise in one of your liver tests (6 points above normal range); I do not think you need to change any meds.   It may be prudent to avoid alcohol as it can also raise this liver test.   We should recheck all in 3 months.     Resulted Orders   Glucose ([facility])   Result Value Ref Range    Glucose 88 70 - 125 mg/dL    Fasting? Yes     Narrative    Test performed by:  [facility]  [address] [place] [address]  Fasting Glucose reference range is 70-99 mg/dL per  American Diabetes Association (ADA) guidelines.   AST (SGOT) ([facility])   Result Value Ref Range    AST (SGOT) 34 0 - 40 U/L    Narrative    Test performed by:  [facility]  [address] [place] [address]   ALT (SGPT) ([facility])   Result Value Ref Range    ALT (SGPT) 50 (H) 0 - 45 U/L    Narrative    Test performed by:  [facility]  [address] [place] [address]   Creatinine ([facility])   Result Value Ref Range    Creatinine 0.84 0.70 - 1.30 mg/dL    GFR Estimate If Black >60 >60 mL/min/1.73m2    GFR Estimate >60 >60 mL/min/1.73m2    Narrative    Test performed by:  [facility]  [address] [place] [address]   CBC w/ Diff and Plt ([facility])   Result Value Ref Range    WBC 5.5 4.0 - 11.0 thou/uL    RBC 4.81 4.40 - 6.20 mill/uL    Hemoglobin 15.0 14.0 - 18.0 g/dL    Hematocrit 44.7 40.0 - 54.0 %    MCV 93 80 - 100 fL    MCH 31.2 27.0 - 34.0 pg    MCHC 33.6 32.0 - 36.0 g/dL    RDW 12.8 11.0 - 14.5 %    Platelets 234 140 - 440 thou/uL    Mean Platelet Volume 10.4 8.5 - 12.5 fL    % Neutrophils 60 50 - 70 %    % Lymphocytes 21 20 - 40 %    % Monocytes 14 (H) 2 - 10 %    %  Eosinophils 3 0 - 6 %    % Basophils 1 0 - 2 %    % Immature Granulocytes 0 <=0 %    Neutrophils (Absolute) 3.3 2.0 - 7.7 thou/uL    Lymphs (Absolute) 1.2 0.8 - 4.4 thou/uL    Monocytes(Absolute) 0.8 0.0 - 0.9 thou/uL    Eos (Absolute) 0.2 0.0 - 0.4 thou/uL    Baso (Absolute) 0.1 0.0 - 0.2 thou/uL    Abs Immature Granulocytes 0.0 <=0.0 thou/uL    Narrative    Test performed by:  [facility] [address] [place] [address]       If you have any questions or concerns, please call the clinic at the number listed above.       Sincerely,      Darius Rhoades MD

## 2020-12-30 NOTE — RESULT ENCOUNTER NOTE
Jani,  Your labs are back and look good.  You do have a slight rise in one of your liver tests (6 points above normal range); I do not think you need to change any meds.  It may be prudent to avoid alcohol as it can also raise this liver test.  We should recheck all in 3 months.  Shabbir

## 2021-01-26 DIAGNOSIS — I26.99 PULMONARY EMBOLISM, UNSPECIFIED CHRONICITY, UNSPECIFIED PULMONARY EMBOLISM TYPE, UNSPECIFIED WHETHER ACUTE COR PULMONALE PRESENT (H): ICD-10-CM

## 2021-03-12 ENCOUNTER — IMMUNIZATION (OUTPATIENT)
Dept: NURSING | Facility: CLINIC | Age: 56
End: 2021-03-12
Payer: COMMERCIAL

## 2021-03-12 PROCEDURE — 91300 PR COVID VAC PFIZER DIL RECON 30 MCG/0.3 ML IM: CPT

## 2021-03-12 PROCEDURE — 0001A PR COVID VAC PFIZER DIL RECON 30 MCG/0.3 ML IM: CPT

## 2021-03-26 DIAGNOSIS — Z79.52 LONG TERM CURRENT USE OF SYSTEMIC STEROIDS: ICD-10-CM

## 2021-03-26 DIAGNOSIS — D86.85 SARCOID MYOCARDITIS: ICD-10-CM

## 2021-03-26 LAB
% IMMATURE GRANULOCYTES: 1 %
ABS IMMATURE GRANULOCYTES: 0.1 THOU/UL
ALT SERPL W/O P-5'-P-CCNC: 39 U/L (ref 0–45)
AST SERPL-CCNC: 25 U/L (ref 0–40)
BASOPHILS # BLD AUTO: 0.1 THOU/UL (ref 0–0.2)
BASOPHILS NFR BLD AUTO: 1 % (ref 0–2)
CREAT SERPL-MCNC: 0.84 MG/DL (ref 0.7–1.3)
EOSINOPHIL # BLD AUTO: 0.1 THOU/UL (ref 0–0.4)
EOSINOPHIL NFR BLD AUTO: 2 % (ref 0–6)
ERYTHROCYTE [DISTWIDTH] IN BLOOD BY AUTOMATED COUNT: 12.7 % (ref 11–14.5)
FASTING?: YES
GLUCOSE SERPL-MCNC: 98 MG/DL (ref 70–125)
HCT VFR BLD AUTO: 45.4 % (ref 40–54)
HGB BLD-MCNC: 14.9 G/DL (ref 14–18)
LYMPHOCYTES # BLD AUTO: 1.5 THOU/UL (ref 0.8–4.4)
LYMPHOCYTES NFR BLD AUTO: 24 % (ref 20–40)
MCH RBC QN AUTO: 30.1 PG (ref 27–34)
MCHC RBC AUTO-ENTMCNC: 32.8 G/DL (ref 32–36)
MCV RBC AUTO: 92 FL (ref 80–100)
MONOCYTES # BLD AUTO: 1 THOU/UL (ref 0–0.9)
MONOCYTES NFR BLD AUTO: 16 % (ref 2–10)
NEUTROPHILS # BLD AUTO: 3.6 THOU/UL (ref 2–7.7)
NEUTROPHILS NFR BLD AUTO: 57 % (ref 50–70)
PLATELET # BLD AUTO: 259 THOU/UL (ref 140–440)
PMV BLD AUTO: 10.4 FL (ref 8.5–12.5)
RBC # BLD AUTO: 4.95 MILL/UL (ref 4.4–6.2)
WBC # BLD AUTO: 6.3 THOU/UL (ref 4–11)

## 2021-03-26 PROCEDURE — 36415 COLL VENOUS BLD VENIPUNCTURE: CPT | Mod: 90

## 2021-03-26 NOTE — LETTER
March 29, 2021      Danie Newell  [address] [address]        Dear ,    We are writing to inform you of your test results.    Your labs are all ok.   Glucose back down.   Liver studies normal.    Resulted Orders   Glucose ([facility])   Result Value Ref Range    Glucose 98 70 - 125 mg/dL    Fasting? Yes     Narrative    Test performed by:  [facility]  [address] [place] [address]  Fasting Glucose reference range is 70-99 mg/dL per  American Diabetes Association (ADA) guidelines.   AST (SGOT) ([facility])   Result Value Ref Range    AST (SGOT) 25 0 - 40 U/L    Narrative    Test performed by:  [facility]  [address] [place] [address]   ALT [facility]SGPT) ([facility])   Result Value Ref Range    ALT (SGPT) 39 0 - 45 U/L    Narrative    Test performed by:  [facility]  [address] [place] [address]   Creatinine ([facility])   Result Value Ref Range    Creatinine 0.84 0.70 - 1.30 mg/dL    GFR Estimate If Black >60 >60 mL/min/1.73m2    GFR Estimate >60 >60 mL/min/1.73m2    Narrative    Test performed by:  [facility]  [address] [place] [address]   CBC w/ Diff and Plt ([facility])   Result Value Ref Range    WBC 6.3 4.0 - 11.0 thou/uL    RBC 4.95 4.40 - 6.20 mill/uL    Hemoglobin 14.9 14.0 - 18.0 g/dL    Hematocrit 45.4 40.0 - 54.0 %    MCV 92 80 - 100 fL    MCH 30.1 27.0 - 34.0 pg    MCHC 32.8 32.0 - 36.0 g/dL    RDW 12.7 11.0 - 14.5 %    Platelets 259 140 - 440 thou/uL    Mean Platelet Volume 10.4 8.5 - 12.5 fL    % Neutrophils 57 50 - 70 %    % Lymphocytes 24 20 - 40 %    % Monocytes 16 (H) 2 - 10 %    % Eosinophils 2 0 - 6 %    % Basophils 1 0 - 2 %    % Immature Granulocytes 1 (H) <=0 %    Neutrophils (Absolute) 3.6 2.0 - 7.7 thou/uL    Lymphs (Absolute) 1.5 0.8 - 4.4 thou/uL    Monocytes(Absolute) 1.0 (H) 0.0 - 0.9 thou/uL     Eos (Absolute) 0.1 0.0 - 0.4 thou/uL    Baso (Absolute) 0.1 0.0 - 0.2 thou/uL    Abs Immature Granulocytes 0.1 (H) <=0.0 thou/uL    Narrative    Test performed by:  [facility] [address] [place] [address]       If you have any questions or concerns, please call the clinic at the number listed above.       Sincerely,      Darius Rhoades MD

## 2021-04-02 ENCOUNTER — IMMUNIZATION (OUTPATIENT)
Dept: NURSING | Facility: CLINIC | Age: 56
End: 2021-04-02
Attending: INTERNAL MEDICINE
Payer: COMMERCIAL

## 2021-04-02 PROCEDURE — 91300 PR COVID VAC PFIZER DIL RECON 30 MCG/0.3 ML IM: CPT

## 2021-04-02 PROCEDURE — 0002A PR COVID VAC PFIZER DIL RECON 30 MCG/0.3 ML IM: CPT

## 2021-06-06 NOTE — TELEPHONE ENCOUNTER
[Documentation of telephone call from Dr Miguel to Danie.  Dr Migule does not have epic access to HE Epic).       Reason for call  Danie tested positive for COVID-19 and was notified by Dr Faisal Miguel at 7:10PM    Reviewed information about home isolation, prevention of spread, and what to do if needing to seek HealthCare Provider care.    MD will be following up with him as well.    [RN Name]  Bryan Bell RN  [employer] [facility]  Emergency Dept Lab Result RN  Ph# [phone]

## 2021-06-06 NOTE — PATIENT INSTRUCTIONS - HE
Instructions for Patients  It is recommended that you stay home with mild symptoms.  To do this follow these instructions:    Isolate Yourself:  Isolate yourself at home.   Do Not allow any visitors  Do Not go to work or school  Do Not go to [employer],  centers, shopping, or other public places.  Do Not shake hands.  Avoid close contact with others (hugging, kissing).    Protect Others:  Cover Your Mouth and Nose with a mask, disposable tissue or wash cloth to avoid spreading germs to others.  Wash your hands and face frequently with soap and water.    Fever Medicines:  For fever relief, take acetaminophen or ibuprofen.  Treat fevers above 101  F (38.3  C) to lower fevers and make you more comfortable.   Acetaminophen (e.g., Tylenol): Take 650 mg (two 325 mg pills) by mouth every 4-6 hours as needed of regular strength Tyleno or 1,000 mg (two 500 mg pills) every 8 hours as needed of Extra Strength Tylenol.   Ibuprofen (e.g., Motrin, Advil): Take 400 mg (two 200 mg pills) by mouth every 6 hours as needed.   Acetaminophen is thought to be safer than ibuprofen or naproxen for people over 65 years old. Acetaminophen is in many OTC and prescription medicines. It might be in more than one medicine that you are taking. You need to be careful and not take an overdose. Before taking any medicine, read all the instructions on the package.  Caution -NSAIDs (e.g., ibuprofen, naproxen): Do not take nonsteroidal anti-inflammatory drugs (NSAIDs) if you have stomach problems, kidney disease, heart failure, or other contraindications to using this type of medicine. Do not take NSAID medicines for over 7 days without consulting your PCP. Do not take NSAID medicines if you are pregnant. Do not take NSAID medicines if you are also taking blood thinners.     Call Back If: Breathing difficulty develops or you become worse.    Thank you for limiting contact with others, wearing a simple mask to cover your cough, practice good  hand hygiene habits and accessing our virtual services where possible to limit the spread of this virus.    For more information about COVID19 and options for caring for yourself at home, please visit the CDC website at https://www.cdc.gov/coronavirus/2019-ncov/about/steps-when-sick.html  For more options for care at [facility], please visit our website at [email or web address]

## 2021-06-06 NOTE — PROGRESS NOTES
No chief complaint on file.      HPI:  Danie Newell is a 55 y.o. male who presents today complaining of concern for Corona virus. Sxs began 20. Patient has had travel to [place] and [facility], and a few other counties over the past 2 weeks. No known exposure to Covid 19.     History obtained from the patient.    Problem List:  There are no relevant problems documented for this patient.      No past medical history on file.    Social History     Tobacco Use     Smoking status: Not on file   Substance Use Topics     Alcohol use: Not on file       Review of Systems   Constitutional: Positive for chills and fever.   Respiratory: Positive for cough. Negative for shortness of breath.        There were no vitals filed for this visit.    Physical Exam  Constitutional:       General: He is not in acute distress.     Appearance: He is well-developed. He is not diaphoretic.   HENT:      Head: Normocephalic and atraumatic.      Right Ear: External ear normal.      Left Ear: External ear normal.   Eyes:      General:         Right eye: No discharge.         Left eye: No discharge.      Conjunctiva/sclera: Conjunctivae normal.   Cardiovascular:      Rate and Rhythm: Normal rate.   Pulmonary:      Effort: Pulmonary effort is normal. No respiratory distress.   Neurological:      Mental Status: He is alert.   Psychiatric:         Behavior: Behavior normal.         Thought Content: Thought content normal.         Judgment: Judgment normal.           Labs:  Recent Results (from the past 72 hour(s))   Influenza A/B Rapid Test    Specimen: Nasal Swab; Nasopharyngeal (Inpt/ED) or Nasal Mucosa (Outpt)   Result Value Ref Range    Influenza  A, Rapid Antigen No Influenza A antigen detected No Influenza A antigen detected    Influenza B, Rapid Antigen No Influenza B antigen detected No Influenza B antigen detected         Clinical Decision Makin. Cough  Influenza A/B Rapid Test    COVID-19 Virus PCR_NP/OP   2. Fever, unspecified  fever cause  Influenza A/B Rapid Test    COVID-19 Virus PCR_NP/OP         Patient Instructions     Instructions for Patients  It is recommended that you stay home with mild symptoms.  To do this follow these instructions:    Isolate Yourself:  Isolate yourself at home.   Do Not allow any visitors  Do Not go to work or school  Do Not go to [employer],  centers, shopping, or other public places.  Do Not shake hands.  Avoid close contact with others (hugging, kissing).    Protect Others:  Cover Your Mouth and Nose with a mask, disposable tissue or wash cloth to avoid spreading germs to others.  Wash your hands and face frequently with soap and water.    Fever Medicines:  For fever relief, take acetaminophen or ibuprofen.  Treat fevers above 101  F (38.3  C) to lower fevers and make you more comfortable.   Acetaminophen (e.g., Tylenol): Take 650 mg (two 325 mg pills) by mouth every 4-6 hours as needed of regular strength Tyleno or 1,000 mg (two 500 mg pills) every 8 hours as needed of Extra Strength Tylenol.   Ibuprofen (e.g., Motrin, Advil): Take 400 mg (two 200 mg pills) by mouth every 6 hours as needed.   Acetaminophen is thought to be safer than ibuprofen or naproxen for people over 65 years old. Acetaminophen is in many OTC and prescription medicines. It might be in more than one medicine that you are taking. You need to be careful and not take an overdose. Before taking any medicine, read all the instructions on the package.  Caution -NSAIDs (e.g., ibuprofen, naproxen): Do not take nonsteroidal anti-inflammatory drugs (NSAIDs) if you have stomach problems, kidney disease, heart failure, or other contraindications to using this type of medicine. Do not take NSAID medicines for over 7 days without consulting your PCP. Do not take NSAID medicines if you are pregnant. Do not take NSAID medicines if you are also taking blood thinners.     Call Back If: Breathing difficulty develops or you become worse.    Thank  you for limiting contact with others, wearing a simple mask to cover your cough, practice good hand hygiene habits and accessing our virtual services where possible to limit the spread of this virus.    For more information about COVID19 and options for caring for yourself at home, please visit the CDC website at https://www.cdc.gov/coronavirus/2019-ncov/about/steps-when-sick.html  For more options for care at [facility], please visit our website at [email or web address]

## 2021-06-06 NOTE — TELEPHONE ENCOUNTER
RN Assessment/Reason for Call:   Dr Marin calling in; spoke with [facility] at the [facility] of [facility]; [phone] call [phone]  Patient saw  [facility] Valeria Curiel 3/10/20..  Concerned about Coronavirus submitted specimen.  Coronavirus confirmed positive  with [facility];  will call him tonight and discuss  isolation protocol and investigation.    3/12/20 [facility] if they had PPE in exposure.    RN Action/Disposition:  They will reach out to us.     Naz Jolly, JERRI    Care Connection Triage/med refill  3/11/2020  7:17 PM

## 2021-07-29 ENCOUNTER — OFFICE VISIT (OUTPATIENT)
Dept: FAMILY MEDICINE | Facility: CLINIC | Age: 56
End: 2021-07-29
Payer: COMMERCIAL

## 2021-07-29 VITALS
SYSTOLIC BLOOD PRESSURE: 121 MMHG | BODY MASS INDEX: 27.11 KG/M2 | TEMPERATURE: 98 F | OXYGEN SATURATION: 98 % | HEART RATE: 67 BPM | WEIGHT: 183 LBS | DIASTOLIC BLOOD PRESSURE: 77 MMHG | HEIGHT: 69 IN | RESPIRATION RATE: 16 BRPM

## 2021-07-29 DIAGNOSIS — D86.85 CARDIAC SARCOIDOSIS: ICD-10-CM

## 2021-07-29 DIAGNOSIS — N13.8 BENIGN PROSTATIC HYPERPLASIA WITH URINARY OBSTRUCTION: ICD-10-CM

## 2021-07-29 DIAGNOSIS — K40.90 LEFT INGUINAL HERNIA: ICD-10-CM

## 2021-07-29 DIAGNOSIS — Z95.810 PRESENCE OF AUTOMATIC (IMPLANTABLE) CARDIAC DEFIBRILLATOR: ICD-10-CM

## 2021-07-29 DIAGNOSIS — N40.1 BENIGN PROSTATIC HYPERPLASIA WITH URINARY OBSTRUCTION: ICD-10-CM

## 2021-07-29 DIAGNOSIS — Z00.00 ROUTINE HISTORY AND PHYSICAL EXAMINATION OF ADULT: Primary | ICD-10-CM

## 2021-07-29 PROBLEM — I26.09 ACUTE PULMONARY EMBOLISM WITH ACUTE COR PULMONALE (H): Status: ACTIVE | Noted: 2020-03-26

## 2021-07-29 PROBLEM — R04.2 HEMOPTYSIS: Status: ACTIVE | Noted: 2020-03-13

## 2021-07-29 PROBLEM — Z86.711 HISTORY OF PULMONARY EMBOLISM: Status: RESOLVED | Noted: 2020-07-21 | Resolved: 2021-07-29

## 2021-07-29 PROBLEM — R04.2 HEMOPTYSIS: Status: RESOLVED | Noted: 2020-03-13 | Resolved: 2021-07-29

## 2021-07-29 PROBLEM — H90.5 SENSORINEURAL HEARING LOSS (SNHL): Status: ACTIVE | Noted: 2020-10-30

## 2021-07-29 PROBLEM — I89.8 CALCIFIED LYMPH NODES: Status: RESOLVED | Noted: 2019-07-18 | Resolved: 2021-07-29

## 2021-07-29 PROBLEM — I26.09 ACUTE PULMONARY EMBOLISM WITH ACUTE COR PULMONALE (H): Status: RESOLVED | Noted: 2020-03-26 | Resolved: 2021-07-29

## 2021-07-29 PROBLEM — B34.2 CORONAVIRUS INFECTION: Status: ACTIVE | Noted: 2020-03-13

## 2021-07-29 PROCEDURE — 99396 PREV VISIT EST AGE 40-64: CPT | Mod: GC | Performed by: FAMILY MEDICINE

## 2021-07-29 RX ORDER — METHOTREXATE 25 MG/ML
25 INJECTION INTRA-ARTERIAL; INTRAMUSCULAR; INTRATHECAL; INTRAVENOUS WEEKLY
COMMUNITY
Start: 2021-03-29 | End: 2024-06-18 | Stop reason: ALTCHOICE

## 2021-07-29 ASSESSMENT — MIFFLIN-ST. JEOR: SCORE: 1635.71

## 2021-07-29 NOTE — PROGRESS NOTES
"Patient presents with:  Physical: No other complaints  Medication Reconciliation: Complete, needs attention        Assessment & Plan   Problem List Items Addressed This Visit        Active Problems    Benign prostatic hyperplasia with urinary obstruction    Cardiac sarcoidosis    Relevant Medications    methotrexate sodium, pres-free, 50 MG/2ML SOLN injection    Left inguinal hernia    Presence of automatic (implantable) cardiac defibrillator    Routine history and physical examination of adult - Primary           Review of external notes as documented elsewhere in note  42 minutes spent on the date of the encounter doing chart review, history and exam, documentation and further activities per the note       BMI:   Estimated body mass index is 27.26 kg/m  as calculated from the following:    Height as of this encounter: 1.745 m (5' 8.7\").    Weight as of this encounter: 83 kg (183 lb).   Weight management plan: Discussed healthy diet and exercise guidelines    MEDICATIONS:  Continue current medications without change  Regular exercise  Patient Instructions   Great news on the cardiac changes.  Gradually increase your physical activity.  Consider the hernia repair.  Consider medication for the prostate obstruction while waiting for the procedure.      Return in about 1 year (around 7/29/2022) for Physical Exam.    Darius Rhoades MD  [facility]    Anurag Gunter is a 56 year old who presents for the following health issues     HPI       SUBJECTIVE:  A 56-year-old male in for health screening exam.  He has been followed by [facility] for cardiac sarcoidosis and he has recently gotten a clean bill of health after moving his methotrexate to subcutaneous.  His energy level is back.  He is starting back in full exercise.  He is off the sotalol and feeling quite well.  He went through an executive physical at [facility] where he focused on his prostate, hernia and some skin issues.  He has some sun " "damaged skin that is not under active treatment at this time.  He has a small left inguinal hernia, which should be repaired sometime in the future, but is not giving him difficulty or pain, so he is not in any rush.    He continues to have prostate symptoms.  He was avoiding starting on medication because of other medications he is on.  He is up most nights now and is having a lot of dribbling.  He is thinking of getting a TUR.  We reviewed that this sweat procedure and the green laser procedure.  He is going to decide and pick one.  If he can get it done quickly, he will not go on medication.  If he cannot get it done for some months I have suggested that he consider one of the alpha blockers to see if he can get some relief while he is waiting for the procedure.    He is otherwise doing well.  His colonoscopy is up to date although he is due in a couple of years.  His immunizations are up to date.  He did have COVID.  He has also had the COVID immunization.  We reviewed his labs from [facility] and there are no other lab studies to do at this time.    ASSESSMENT AND PLAN:  We will continue his current medical cares and we will reevaluate in 1 year, earlier if problems.  The patient involved in medical decision making throughout the visit.        Review of Systems   Constitutional, HEENT, cardiovascular, pulmonary, gi and gu systems are negative, except as otherwise noted.      Objective    /77 (BP Location: Right arm, Patient Position: Sitting, Cuff Size: Adult Regular)   Pulse 67   Temp 98  F (36.7  C) (Oral)   Resp 16   Ht 1.745 m (5' 8.7\")   Wt 83 kg (183 lb)   SpO2 98%   BMI 27.26 kg/m    Body mass index is 27.26 kg/m .  Physical Exam       Reviewed labs from [facility]         "

## 2021-07-29 NOTE — PATIENT INSTRUCTIONS
Great news on the cardiac changes.  Gradually increase your physical activity.  Consider the hernia repair.  Consider medication for the prostate obstruction while waiting for the procedure.

## 2021-10-02 ENCOUNTER — HEALTH MAINTENANCE LETTER (OUTPATIENT)
Age: 56
End: 2021-10-02

## 2022-01-22 ENCOUNTER — HEALTH MAINTENANCE LETTER (OUTPATIENT)
Age: 57
End: 2022-01-22

## 2022-04-28 ENCOUNTER — OFFICE VISIT (OUTPATIENT)
Dept: FAMILY MEDICINE | Facility: CLINIC | Age: 57
End: 2022-04-28
Payer: COMMERCIAL

## 2022-04-28 VITALS
SYSTOLIC BLOOD PRESSURE: 117 MMHG | OXYGEN SATURATION: 99 % | HEIGHT: 70 IN | BODY MASS INDEX: 27.35 KG/M2 | TEMPERATURE: 98 F | RESPIRATION RATE: 12 BRPM | DIASTOLIC BLOOD PRESSURE: 76 MMHG | WEIGHT: 191 LBS | HEART RATE: 60 BPM

## 2022-04-28 DIAGNOSIS — D86.85 CARDIAC SARCOIDOSIS: ICD-10-CM

## 2022-04-28 DIAGNOSIS — Z11.4 SCREENING FOR HIV (HUMAN IMMUNODEFICIENCY VIRUS): ICD-10-CM

## 2022-04-28 DIAGNOSIS — D86.9 SARCOIDOSIS: ICD-10-CM

## 2022-04-28 DIAGNOSIS — Z23 HIGH PRIORITY FOR 2019-NCOV VACCINE: ICD-10-CM

## 2022-04-28 DIAGNOSIS — M75.101 ROTATOR CUFF SYNDROME, RIGHT: ICD-10-CM

## 2022-04-28 DIAGNOSIS — Z00.00 ROUTINE HISTORY AND PHYSICAL EXAMINATION OF ADULT: Primary | ICD-10-CM

## 2022-04-28 DIAGNOSIS — Z13.220 SCREENING FOR HYPERLIPIDEMIA: ICD-10-CM

## 2022-04-28 DIAGNOSIS — Z95.810 PRESENCE OF AUTOMATIC (IMPLANTABLE) CARDIAC DEFIBRILLATOR: ICD-10-CM

## 2022-04-28 PROCEDURE — 0054A COVID-19,PF,PFIZER (12+ YRS): CPT | Performed by: FAMILY MEDICINE

## 2022-04-28 PROCEDURE — 99396 PREV VISIT EST AGE 40-64: CPT | Mod: 25 | Performed by: FAMILY MEDICINE

## 2022-04-28 PROCEDURE — 91305 COVID-19,PF,PFIZER (12+ YRS): CPT | Performed by: FAMILY MEDICINE

## 2022-04-28 NOTE — PATIENT INSTRUCTIONS
Exercises for posture correction to reduce shoulder    Shoulder Pain Exercises to Improve Posture    Stretch in the corner 20-30 seconds at time, repeat 3 times.  Put your elbows on the wall, rather than your hands.          Squeeze your shoulder blades together for 10 seconds at a time, 10 repetitions, 3 times a day.          Do the low row exercise by squeezing your elbows together behind you, dropping your hands down, and pushing backward against a counter as pictured. Do 10 -10 second holds and repeat three sets a day.        Do wax on, wax off exercises on the table top or wall. Keep the motion in the pain free range.

## 2022-04-28 NOTE — PROGRESS NOTES
Patient presents with:  Physical: Check up and update on health  Imm/Inj: COVID-19 VACCINE    Shoulder pain  Tinnitus  Post hernia repair  Decreased energy   Prickly sensation in legs (like wool itch)  Achy joints    57-year-old male in for health screening exam.  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  He has been on methotrexate now for a year and he has another year to go with therapy.  He is followed by [facility] for his sarcoidosis and several other problems.  We reviewed all his recent labs.  His main problems today are shoulder pain, tinnitus and decreased energy.  He also has joint aches and prickly sensation in his legs like the itch you get from wearing wool and decreased energy, which has been present since he had COVID.  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  He was able to ski this winter despite having the hernia repaired.  He just could not ski at his usual level, which bothers him.  The tinnitus is constant.  It is controlled with white noise at night.  It seems to wax and wane a bit.    PHYSICAL EXAMINATION:  On exam of his shoulder, has a painful arc at about  degrees.  He is tender over the anterior rotator cuff and supraspinatus tendon is mildly tender.  He has no scapular winging.  His posture is round shoulder.  His head is forward of neutral.  He has pain with abduction and external rotation, especially if he tries to reach into the back seat of a car.  He has no particular injury that he can think of. Both shoulders were effected initially, but now it is mainly in the right.    We reviewed his labs from [facility].  We talked about using methotrexate and I have given him exercises for his shoulder.  We will plan to recheck in 6 months or earlier if problems.        Assessment & Plan   Problem List Items Addressed This Visit        Active Problems    Routine history and physical examination of adult - Primary    Sarcoidosis    Cardiac sarcoidosis    Presence of  "automatic (implantable) cardiac defibrillator      Other Visit Diagnoses     Rotator cuff syndrome, right        Screening for HIV (human immunodeficiency virus)        Screening for hyperlipidemia        High priority for 2019-nCoV vaccine        Relevant Orders    COVID-19,PF,PFIZER (12+ Yrs GRAY LABEL) (Completed)           Review of external notes as documented elsewhere in note  344 minutes spent on the date of the encounter doing chart review, history and exam, documentation and further activities per the note       BMI:   Estimated body mass index is 27.6 kg/m  as calculated from the following:    Height as of this encounter: 1.772 m (5' 9.75\").    Weight as of this encounter: 86.6 kg (191 lb).   Weight management plan: Discussed healthy diet and exercise guidelines    MEDICATIONS:  Continue current medications without change  Work on weight loss  Regular exercise  Patient Instructions   Exercises for posture correction to reduce shoulder    Shoulder Pain Exercises to Improve Posture    Stretch in the corner 20-30 seconds at time, repeat 3 times.  Put your elbows on the wall, rather than your hands.          Squeeze your shoulder blades together for 10 seconds at a time, 10 repetitions, 3 times a day.          Do the low row exercise by squeezing your elbows together behind you, dropping your hands down, and pushing backward against a counter as pictured. Do 10 -10 second holds and repeat three sets a day.        Do wax on, wax off exercises on the table top or wall. Keep the motion in the pain free range.                    Return in about 6 months (around 10/28/2022) for BP Recheck.    Darius Rhoades MD  [facility] Gunter is a 57 year old who presents for the following health issues     HPI           Review of Systems   Constitutional, HEENT, cardiovascular, pulmonary, gi and gu systems are negative, except as otherwise noted.      Objective    /76 (BP " "Location: Left arm, Patient Position: Sitting, Cuff Size: Adult Large)   Pulse 60   Temp 98  F (36.7  C) (Oral)   Resp 12   Ht 1.772 m (5' 9.75\")   Wt 86.6 kg (191 lb)   SpO2 99%   BMI 27.60 kg/m    Body mass index is 27.6 kg/m .  Physical Exam                   "

## 2022-09-03 ENCOUNTER — HEALTH MAINTENANCE LETTER (OUTPATIENT)
Age: 57
End: 2022-09-03

## 2022-11-01 ENCOUNTER — OFFICE VISIT (OUTPATIENT)
Dept: FAMILY MEDICINE | Facility: CLINIC | Age: 57
End: 2022-11-01
Payer: COMMERCIAL

## 2022-11-01 VITALS
SYSTOLIC BLOOD PRESSURE: 137 MMHG | WEIGHT: 187.08 LBS | HEART RATE: 57 BPM | BODY MASS INDEX: 27.04 KG/M2 | RESPIRATION RATE: 18 BRPM | DIASTOLIC BLOOD PRESSURE: 82 MMHG | OXYGEN SATURATION: 98 %

## 2022-11-01 DIAGNOSIS — Z95.810 PRESENCE OF AUTOMATIC (IMPLANTABLE) CARDIAC DEFIBRILLATOR: ICD-10-CM

## 2022-11-01 DIAGNOSIS — D86.9 SARCOIDOSIS: ICD-10-CM

## 2022-11-01 DIAGNOSIS — G56.03 BILATERAL CARPAL TUNNEL SYNDROME: ICD-10-CM

## 2022-11-01 DIAGNOSIS — D86.85 CARDIAC SARCOIDOSIS: Primary | ICD-10-CM

## 2022-11-01 LAB
ANION GAP SERPL CALCULATED.3IONS-SCNC: 13 MMOL/L (ref 7–15)
AST SERPL W P-5'-P-CCNC: 29 U/L (ref 10–50)
BASOPHILS # BLD AUTO: 0 10E3/UL (ref 0–0.2)
BASOPHILS NFR BLD AUTO: 1 %
BUN SERPL-MCNC: 15.8 MG/DL (ref 8–23)
CALCIUM SERPL-MCNC: 10.1 MG/DL (ref 8.6–10)
CHLORIDE SERPL-SCNC: 104 MMOL/L (ref 98–107)
CREAT SERPL-MCNC: 0.93 MG/DL (ref 0.67–1.17)
DEPRECATED HCO3 PLAS-SCNC: 25 MMOL/L (ref 22–29)
EOSINOPHIL # BLD AUTO: 0.2 10E3/UL (ref 0–0.7)
EOSINOPHIL NFR BLD AUTO: 3 %
ERYTHROCYTE [DISTWIDTH] IN BLOOD BY AUTOMATED COUNT: 12.4 % (ref 10–15)
GFR SERPL CREATININE-BSD FRML MDRD: >90 ML/MIN/1.73M2
GLUCOSE SERPL-MCNC: 97 MG/DL (ref 70–99)
HCT VFR BLD AUTO: 45.6 % (ref 40–53)
HGB BLD-MCNC: 15.4 G/DL (ref 13.3–17.7)
IMM GRANULOCYTES # BLD: 0 10E3/UL
IMM GRANULOCYTES NFR BLD: 0 %
LYMPHOCYTES # BLD AUTO: 1.9 10E3/UL (ref 0.8–5.3)
LYMPHOCYTES NFR BLD AUTO: 31 %
MCH RBC QN AUTO: 30.7 PG (ref 26.5–33)
MCHC RBC AUTO-ENTMCNC: 33.8 G/DL (ref 31.5–36.5)
MCV RBC AUTO: 91 FL (ref 78–100)
MONOCYTES # BLD AUTO: 0.6 10E3/UL (ref 0–1.3)
MONOCYTES NFR BLD AUTO: 10 %
NEUTROPHILS # BLD AUTO: 3.3 10E3/UL (ref 1.6–8.3)
NEUTROPHILS NFR BLD AUTO: 55 %
PLATELET # BLD AUTO: 203 10E3/UL (ref 150–450)
POTASSIUM SERPL-SCNC: 4.4 MMOL/L (ref 3.4–5.3)
RBC # BLD AUTO: 5.02 10E6/UL (ref 4.4–5.9)
SODIUM SERPL-SCNC: 142 MMOL/L (ref 136–145)
WBC # BLD AUTO: 6 10E3/UL (ref 4–11)

## 2022-11-01 PROCEDURE — 36415 COLL VENOUS BLD VENIPUNCTURE: CPT | Performed by: FAMILY MEDICINE

## 2022-11-01 PROCEDURE — 84450 TRANSFERASE (AST) (SGOT): CPT | Performed by: FAMILY MEDICINE

## 2022-11-01 PROCEDURE — 85025 COMPLETE CBC W/AUTO DIFF WBC: CPT | Performed by: FAMILY MEDICINE

## 2022-11-01 PROCEDURE — 99214 OFFICE O/P EST MOD 30 MIN: CPT | Performed by: FAMILY MEDICINE

## 2022-11-01 PROCEDURE — 80048 BASIC METABOLIC PNL TOTAL CA: CPT | Performed by: FAMILY MEDICINE

## 2022-11-01 NOTE — PROGRESS NOTES
"Patient presents with:  Imm/Inj: COVID booster and the FLU   Wrist Pain: Follow up, pain is worsening  Blood Draw: Labs from [facility]      Assessment & Plan   Problem List Items Addressed This Visit        Active Problems    Sarcoidosis    Relevant Orders    CBC with Platelets & Differential    AST    Basic metabolic panel    Cardiac sarcoidosis - Primary    Relevant Orders    CBC with Platelets & Differential    AST    Basic metabolic panel    Presence of automatic (implantable) cardiac defibrillator   Other Visit Diagnoses     Bilateral carpal tunnel syndrome               Review of prior external note(s) from - [facility]where information from [place] reviewed  29 minutes spent on the date of the encounter doing chart review, history and exam, documentation and further activities per the note       BMI:   Estimated body mass index is 27.04 kg/m  as calculated from the following:    Height as of 4/28/22: 1.772 m (5' 9.75\").    Weight as of this encounter: 84.9 kg (187 lb 1.3 oz).   Weight management plan: Discussed healthy diet and exercise guidelines    MEDICATIONS:  Continue current medications without change  Work on weight loss  Regular exercise  Patient Instructions   Will get lab results to Dr Pinto.  Resume you exercise.    Your health will be better with weight loss. To lose weight, you will need to increase your walking slowly (a minute or 2 a day) until you are walking 60 minutes every day. To reduce your total calories you will need to stop eating rice, potatos, breads, cookies, cakes, and sweets. You can get your carbohydrates from fruits and vegetables. Your meal portions will have to be small.        Return in about 3 months (around 2/1/2023) for Lab Work.    Darius Rhoades MD  [facility] Gunter is a 57 year old presenting for the following health issues:  Imm/Inj (COVID booster and the FLU ), Wrist Pain (Follow up, pain is worsening), and Blood Draw (Labs " from [facility])      HPI     SUBJECTIVE:  A 57-year-old male with cardiac sarcoidosis treated by Rheumatology at [facility] with methotrexate.  He is doing better, and his dosage is being reduced.  He is here to check on lab studies for his medications and wrist pain and numbness.  He had COVID early in the pandemic and is continuing on his boosters and would like the new shot and a flu shot today.    He has been having numbness in both hands, mostly in the distribution of the median nerve.  It was worse for him several years ago, but it seems to be coming back now. He can have pain in the thumb, but mostly it is numbness in the first 3-4 digits.  Occasionally, the 5th digit is involved, also.      OBJECTIVE:  On exam, his Tinel sign is marginally positive. Pressure over the carpal tunnel reproduces the symptoms quickly.  His thenar and hypothenar eminences are not atrophied.    He has gained weight and is going back to the gym.  He is working on improving his [place]    He appears in good health.  We will check his labs today.  We will give him his flu shot and COVID booster, and we will schedule him for an injection of the worst wrist sometime in the next few weeks.  We will then consider hand surgery if needed.    The patient involved in medical decision making throughout the visit.    Lab results to his Rheumatology physician at [facility] when available.    We will recheck labs in 3 months.      Review of Systems   Constitutional, HEENT, cardiovascular, pulmonary, gi and gu systems are negative, except as otherwise noted.      Objective    /82   Pulse 57   Resp 18   Wt 84.9 kg (187 lb 1.3 oz)   SpO2 98%   BMI 27.04 kg/m    Body mass index is 27.04 kg/m .  Physical Exam       Results for orders placed or performed in visit on 11/01/22 (from the past 24 hour(s))   CBC with Platelets & Differential    Narrative    The following orders were created for panel order CBC  with Platelets & Differential.  Procedure                               Abnormality         Status                     ---------                               -----------         ------                     CBC with platelets and d...[[id number]]                                                   Please view results for these tests on the individual orders.

## 2022-11-01 NOTE — PATIENT INSTRUCTIONS
Will get lab results to Dr Pinto.  Resume you exercise.    Your health will be better with weight loss. To lose weight, you will need to increase your walking slowly (a minute or 2 a day) until you are walking 60 minutes every day. To reduce your total calories you will need to stop eating rice, potatos, breads, cookies, cakes, and sweets. You can get your carbohydrates from fruits and vegetables. Your meal portions will have to be small.

## 2022-11-08 ENCOUNTER — OFFICE VISIT (OUTPATIENT)
Dept: FAMILY MEDICINE | Facility: CLINIC | Age: 57
End: 2022-11-08
Payer: COMMERCIAL

## 2022-11-08 VITALS
RESPIRATION RATE: 18 BRPM | OXYGEN SATURATION: 99 % | DIASTOLIC BLOOD PRESSURE: 88 MMHG | BODY MASS INDEX: 27.04 KG/M2 | HEART RATE: 62 BPM | SYSTOLIC BLOOD PRESSURE: 130 MMHG | WEIGHT: 187.12 LBS

## 2022-11-08 DIAGNOSIS — G56.03 BILATERAL CARPAL TUNNEL SYNDROME: Primary | ICD-10-CM

## 2022-11-08 PROCEDURE — 20605 DRAIN/INJ JOINT/BURSA W/O US: CPT | Performed by: FAMILY MEDICINE

## 2022-11-08 RX ORDER — TRIAMCINOLONE ACETONIDE 40 MG/ML
40 INJECTION, SUSPENSION INTRA-ARTICULAR; INTRAMUSCULAR ONCE
Status: COMPLETED | OUTPATIENT
Start: 2022-11-08 | End: 2022-11-08

## 2022-11-08 RX ORDER — OXYCODONE HYDROCHLORIDE 5 MG/1
5 TABLET ORAL
COMMUNITY
Start: 2021-12-13 | End: 2022-12-13

## 2022-11-08 RX ORDER — METHOTREXATE 25 MG/ML
22.5 INJECTION, SOLUTION INTRA-ARTERIAL; INTRAMUSCULAR; INTRAVENOUS
COMMUNITY
Start: 2022-10-31 | End: 2024-06-18 | Stop reason: ALTCHOICE

## 2022-11-08 RX ORDER — LIDOCAINE HYDROCHLORIDE 10 MG/ML
2 INJECTION, SOLUTION EPIDURAL; INFILTRATION; INTRACAUDAL; PERINEURAL ONCE
Status: COMPLETED | OUTPATIENT
Start: 2022-11-08 | End: 2022-11-08

## 2022-11-08 RX ADMIN — TRIAMCINOLONE ACETONIDE 40 MG: 40 INJECTION, SUSPENSION INTRA-ARTICULAR; INTRAMUSCULAR at 08:53

## 2022-11-08 RX ADMIN — LIDOCAINE HYDROCHLORIDE 2 ML: 10 INJECTION, SOLUTION EPIDURAL; INFILTRATION; INTRACAUDAL; PERINEURAL at 08:53

## 2022-11-08 NOTE — PROGRESS NOTES
Patient presents with:  Imm/Inj: Steroid injection, L wrist      Assessment & Plan   Problem List Items Addressed This Visit    None  Visit Diagnoses     Bilateral carpal tunnel syndrome    -  Primary    Relevant Medications    methotrexate 50 MG/2ML injection    triamcinolone (KENALOG-40) injection 40 mg (Completed)    lidocaine (PF) (XYLOCAINE) 1 % injection 2 mL (Completed)    Other Relevant Orders    Medium Joint/Bursa injection and/or drainage - Unilateral (Elbow, TM, Wrist, Ankle) [20605] (Completed)             24 minutes spent on the date of the encounter doing chart review, history and exam, documentation and further activities per the note including the consent, prep, and procedure.       MEDICATIONS:  Continue current medications without change  Regular exercise  Patient Instructions   Warm pack your wrist if the pain flairs.  Ibuprofen or tylenol for pain.        Return if symptoms worsen or fail to improve.    Darius Rhoades MD  [facility]    Anurag Gunter is a 57 year old presenting for the following health issues:  Imm/Inj (Steroid injection, L wrist)      HPI       SUBJECTIVE:  A 57-year-old male with bilateral carpal tunnel syndrome, worse on the left, with tingling in the median distribution, who is in for injection of the carpal tunnel.    OBJECTIVE:  We discussed risks and benefits and 1 mL of Aristospan 40 mg per mL and 2 mL plus 1% lidocaine injected the carpal tunnel without complication.  He did start to feel some increased numbness after the injection.  We did talk about postinjection flare and warm packing for relief.    ASSESSMENT/PLAN:  The patient involved in medical decision-making throughout the visit.    We will reevaluate on an as-needed basis.  If this fails, we will have him see Hand Surgery for carpal tunnel release.        Review of Systems   Constitutional, HEENT, cardiovascular, pulmonary, gi and gu systems are negative, except as otherwise  noted.      Objective    /88   Pulse 62   Resp 18   Wt 84.9 kg (187 lb 1.9 oz)   SpO2 99%   BMI 27.04 kg/m    Body mass index is 27.04 kg/m .  Physical Exam

## 2023-01-01 ENCOUNTER — TELEPHONE (OUTPATIENT)
Dept: FAMILY MEDICINE | Facility: CLINIC | Age: 58
End: 2023-01-01

## 2023-01-01 NOTE — TELEPHONE ENCOUNTER
Fatigue, congestion, mild cough, sore throat   Temp  Of 99.6     No Shortness of breath     Symptoms started 5 days ago, but were very mild, worsened about 3 days ago  Tested positive- today (negative test Wednesday, Friday)  Son positive on Tuesday     Was hospitalized with covid 2 years ago   On methotrexate for cardiac sarcoid.  Fully vaccinated against COVID-19  Wanted to check in given his immunocompromise status.    We discussed that we could try a paxlovid  prescription, given his immunocompromise status.  This would likely not help with symptoms, but may decrease the risk of progression of disease.  However, he is on the outside edge of timeline to receive benefit from this.  He he felt that his symptoms were mild enough that he was comfortable managing at home.  This seems reasonable to me.  He will call the clinic and check in for DC next week if he has any further concerns.    Abdi Salazar MD, PGY2  [facility]   Pgr: [phone] or Uepaa David

## 2023-05-24 ASSESSMENT — ENCOUNTER SYMPTOMS
JOINT SWELLING: 0
HEARTBURN: 0
PALPITATIONS: 0
PARESTHESIAS: 1
MYALGIAS: 0
SORE THROAT: 0
NERVOUS/ANXIOUS: 0
DYSURIA: 0
COUGH: 0
FEVER: 0
ARTHRALGIAS: 0
EYE PAIN: 1
NAUSEA: 0
SHORTNESS OF BREATH: 0
DIARRHEA: 0
HEMATOCHEZIA: 0
WEAKNESS: 0
FREQUENCY: 1
CHILLS: 0
HEADACHES: 0
CONSTIPATION: 0
HEMATURIA: 0
DIZZINESS: 0
ABDOMINAL PAIN: 0

## 2023-05-25 ENCOUNTER — OFFICE VISIT (OUTPATIENT)
Dept: FAMILY MEDICINE | Facility: CLINIC | Age: 58
End: 2023-05-25
Payer: COMMERCIAL

## 2023-05-25 VITALS
HEIGHT: 69 IN | SYSTOLIC BLOOD PRESSURE: 129 MMHG | OXYGEN SATURATION: 98 % | DIASTOLIC BLOOD PRESSURE: 86 MMHG | BODY MASS INDEX: 27.27 KG/M2 | RESPIRATION RATE: 18 BRPM | WEIGHT: 184.12 LBS | HEART RATE: 58 BPM

## 2023-05-25 DIAGNOSIS — H00.015 HORDEOLUM EXTERNUM OF LEFT LOWER EYELID: ICD-10-CM

## 2023-05-25 DIAGNOSIS — L57.0 ACTINIC KERATOSIS: ICD-10-CM

## 2023-05-25 DIAGNOSIS — Z00.00 ROUTINE HISTORY AND PHYSICAL EXAMINATION OF ADULT: Primary | ICD-10-CM

## 2023-05-25 PROCEDURE — 99396 PREV VISIT EST AGE 40-64: CPT | Mod: 25 | Performed by: FAMILY MEDICINE

## 2023-05-25 PROCEDURE — 17000 DESTRUCT PREMALG LESION: CPT | Performed by: FAMILY MEDICINE

## 2023-05-25 PROCEDURE — 90677 PCV20 VACCINE IM: CPT | Performed by: FAMILY MEDICINE

## 2023-05-25 PROCEDURE — 90471 IMMUNIZATION ADMIN: CPT | Performed by: FAMILY MEDICINE

## 2023-05-25 RX ORDER — COVID-19 MOLECULAR TEST ASSAY
KIT MISCELLANEOUS
COMMUNITY
Start: 2022-12-29 | End: 2023-06-09

## 2023-05-25 RX ORDER — ERYTHROMYCIN 5 MG/G
OINTMENT OPHTHALMIC
COMMUNITY
Start: 2023-05-16 | End: 2023-06-09

## 2023-05-25 ASSESSMENT — ENCOUNTER SYMPTOMS
JOINT SWELLING: 0
FREQUENCY: 1
EYE PAIN: 1
COUGH: 0
HEADACHES: 0
DYSURIA: 0
WEAKNESS: 0
DIZZINESS: 0
FEVER: 0
HEARTBURN: 0
HEMATOCHEZIA: 0
NAUSEA: 0
PALPITATIONS: 0
HEMATURIA: 0
MYALGIAS: 0
CHILLS: 0
ARTHRALGIAS: 0
SHORTNESS OF BREATH: 0
NERVOUS/ANXIOUS: 0
DIARRHEA: 0
SORE THROAT: 0
CONSTIPATION: 0
PARESTHESIAS: 1
ABDOMINAL PAIN: 0

## 2023-05-25 NOTE — PATIENT INSTRUCTIONS
Keep your scheduled appointments.  Point out the spot we froze today to your dermatologist.  Same meds  Recheck one year.

## 2023-05-25 NOTE — PROGRESS NOTES
SUBJECTIVE:   CC: Jani is an 58 year old who presents for preventative health visit.       5/25/2023     9:10 AM   Additional Questions   Roomed by christopher       Healthy Habits:     Getting at least 3 servings of Calcium per day:  Yes    Bi-annual eye exam:  Yes    Dental care twice a year:  Yes    Sleep apnea or symptoms of sleep apnea:  None    Diet:  Regular (no restrictions)    Frequency of exercise:  4-5 days/week    Duration of exercise:  15-30 minutes    Taking medications regularly:  Yes    Medication side effects:  None    PHQ-2 Total Score: 0      POOR AUDIO    SUBJECTIVE:  This is a 58-year-old male in for health screening exam.  His chronic diseases are stable and he is being managed for his sarcoidosis by a team at [facility].  He is due for a pneumonia shot today.  We will give him either the 20 or 23 based on our supply.  He has a colonoscopy scheduled.  He is not having any urinary obstruction problems.  His past PSAs have been normal.  There is no family history.    OBJECTIVE:  His prostate exam today is normal with expected age-related enlargement but no nodules.    He does have a lesion on the tip of his nose, which he has scraped off a couple of times and he is wondering what it could be.  It is scaly to feel.  On visual inspection, it is slightly raised.  It has little tiny dots on it.  With the dermatoscope these dots appear to be melanotic.  They are very small and spread out but probably 10-15 in the 1.5 mm diameter lesion.  We elected to freeze it today with liquid nitrogen.  He has a followup visit scheduled with his dermatologist in August.  I have asked him to point it out to the dermatologist and pursue further treatment at that time.  He has frequent lab studies at [facility] so we will not repeat any of those today.  Have recommended he get a physical exam again in a year and we talked about [facility].    Patient involved in medical decision-making throughout the visit.      Today's PHQ-2 Score:        5/25/2023     9:11 AM   PHQ-2 ( 1999 [employer])   Q1: Little interest or pleasure in doing things 0   Q2: Feeling down, depressed or hopeless 0   PHQ-2 Score 0         Social History     Tobacco Use     Smoking status: Never     Passive exposure: Never     Smokeless tobacco: Never   Vaping Use     Vaping status: Never Used   Substance Use Topics     Alcohol use: Yes     Alcohol/week: 0.0 standard drinks of alcohol     Comment: 3-4 drinks/week             5/24/2023    10:51 AM   Alcohol Use   Prescreen: >3 drinks/day or >7 drinks/week? No       Last PSA:   PSA   Date Value Ref Range Status   07/10/2018 1.8 0.0 - 3.5 ng/mL Final       Reviewed orders with patient. Reviewed health maintenance and updated orders accordingly - Yes      Reviewed and updated as needed this visit by clinical staff    Allergies  Meds              Reviewed and updated as needed this visit by Provider                     Review of Systems   Constitutional: Negative for chills and fever.   HENT: Positive for hearing loss. Negative for congestion, ear pain and sore throat.    Eyes: Positive for pain. Negative for visual disturbance.   Respiratory: Negative for cough and shortness of breath.    Cardiovascular: Negative for chest pain, palpitations and peripheral edema.   Gastrointestinal: Negative for abdominal pain, constipation, diarrhea, heartburn, hematochezia and nausea.   Genitourinary: Positive for frequency and urgency. Negative for dysuria, genital sores, hematuria, impotence and penile discharge.   Musculoskeletal: Negative for arthralgias, joint swelling and myalgias.   Skin: Negative for rash.   Neurological: Positive for paresthesias. Negative for dizziness, weakness and headaches.   Psychiatric/Behavioral: Negative for mood changes. The patient is not nervous/anxious.    All other systems reviewed and are negative.    CONSTITUTIONAL: NEGATIVE for fever, chills, change in weight  INTEGUMENTARY/SKIN: NEGATIVE for worrisome rashes,  "moles or lesions  EYES: NEGATIVE for vision changes or irritation  ENT: NEGATIVE for ear, mouth and throat problems  RESP: NEGATIVE for significant cough or SOB  CV: NEGATIVE for chest pain, palpitations or peripheral edema  GI: NEGATIVE for nausea, abdominal pain, heartburn, or change in bowel habits   male: negative for dysuria, hematuria, decreased urinary stream, erectile dysfunction, urethral discharge  MUSCULOSKELETAL: NEGATIVE for significant arthralgias or myalgia  NEURO: NEGATIVE for weakness, dizziness or paresthesias  PSYCHIATRIC: NEGATIVE for changes in mood or affect    OBJECTIVE:   /86 (BP Location: Right arm, Patient Position: Sitting, Cuff Size: Adult Regular)   Pulse 58   Resp 18   Ht 1.753 m (5' 9\")   Wt 83.5 kg (184 lb 1.9 oz)   SpO2 98%   BMI 27.19 kg/m      Physical Exam          ASSESSMENT/PLAN:   (Z00.00) Routine history and physical examination of adult  (primary encounter diagnosis)  Comment:   Plan:     (H00.015) Hordeolum externum of left lower eyelid  Comment:   Plan:     (L57.0) Actinic keratosis  Comment:   Plan: DESTRUCT PREMALIGNANT LESION, 2-14                  COUNSELING:   Reviewed preventive health counseling, as reflected in patient instructions       Regular exercise       Healthy diet/nutrition       Pneumococcal Vaccine         BMI:   Estimated body mass index is 27.19 kg/m  as calculated from the following:    Height as of this encounter: 1.753 m (5' 9\").    Weight as of this encounter: 83.5 kg (184 lb 1.9 oz).   Weight management plan: Discussed healthy diet and exercise guidelines      He reports that he has never smoked. He has never been exposed to tobacco smoke. He has never used smokeless tobacco.        Darius Rhoades MD  [facility]  "

## 2023-05-25 NOTE — NURSING NOTE
Prior to immunization administration, verified patients identity using patient s name and date of birth. Please see Immunization Activity for additional information.     Screening Questionnaire for Adult Immunization    Are you sick today?   No   Do you have allergies to medications, food, a vaccine component or latex?   No   Have you ever had a serious reaction after receiving a vaccination?   No   Do you have a long-term health problem with heart, lung, kidney, or metabolic disease (e.g., diabetes), asthma, a blood disorder, no spleen, complement component deficiency, a cochlear implant, or a spinal fluid leak?  Are you on long-term aspirin therapy?   No   Do you have cancer, leukemia, HIV/AIDS, or any other immune system problem?   No   Do you have a parent, brother, or sister with an immune system problem?   No   In the past 3 months, have you taken medications that affect  your immune system, such as prednisone, other steroids, or anticancer drugs; drugs for the treatment of rheumatoid arthritis, Crohn s disease, or psoriasis; or have you had radiation treatments?   No   Have you had a seizure, or a brain or other nervous system problem?   No   During the past year, have you received a transfusion of blood or blood    products, or been given immune (gamma) globulin or antiviral drug?   No   For women: Are you pregnant or is there a chance you could become       pregnant during the next month?   No   Have you received any vaccinations in the past 4 weeks?   No     Immunization questionnaire answers were all negative.      Injection of [id number] given by Lebron Bird MA. Patient instructed to remain in clinic for 15 minutes afterwards, and to report any adverse reactions.     Screening performed by Lebron Bird MA on 5/25/2023 at 2:06 PM.

## 2023-05-25 NOTE — H&P (VIEW-ONLY)
SUBJECTIVE:   CC: Jani is an 58 year old who presents for preventative health visit.       5/25/2023     9:10 AM   Additional Questions   Roomed by christopher       Healthy Habits:     Getting at least 3 servings of Calcium per day:  Yes    Bi-annual eye exam:  Yes    Dental care twice a year:  Yes    Sleep apnea or symptoms of sleep apnea:  None    Diet:  Regular (no restrictions)    Frequency of exercise:  4-5 days/week    Duration of exercise:  15-30 minutes    Taking medications regularly:  Yes    Medication side effects:  None    PHQ-2 Total Score: 0      POOR AUDIO    SUBJECTIVE:  This is a 58-year-old male in for health screening exam.  His chronic diseases are stable and he is being managed for his sarcoidosis by a team at [facility].  He is due for a pneumonia shot today.  We will give him either the 20 or 23 based on our supply.  He has a colonoscopy scheduled.  He is not having any urinary obstruction problems.  His past PSAs have been normal.  There is no family history.    OBJECTIVE:  His prostate exam today is normal with expected age-related enlargement but no nodules.    He does have a lesion on the tip of his nose, which he has scraped off a couple of times and he is wondering what it could be.  It is scaly to feel.  On visual inspection, it is slightly raised.  It has little tiny dots on it.  With the dermatoscope these dots appear to be melanotic.  They are very small and spread out but probably 10-15 in the 1.5 mm diameter lesion.  We elected to freeze it today with liquid nitrogen.  He has a followup visit scheduled with his dermatologist in August.  I have asked him to point it out to the dermatologist and pursue further treatment at that time.  He has frequent lab studies at [facility] so we will not repeat any of those today.  Have recommended he get a physical exam again in a year and we talked about [facility].    Patient involved in medical decision-making throughout the visit.      Today's PHQ-2 Score:        5/25/2023     9:11 AM   PHQ-2 ( 1999 [employer])   Q1: Little interest or pleasure in doing things 0   Q2: Feeling down, depressed or hopeless 0   PHQ-2 Score 0         Social History     Tobacco Use     Smoking status: Never     Passive exposure: Never     Smokeless tobacco: Never   Vaping Use     Vaping status: Never Used   Substance Use Topics     Alcohol use: Yes     Alcohol/week: 0.0 standard drinks of alcohol     Comment: 3-4 drinks/week             5/24/2023    10:51 AM   Alcohol Use   Prescreen: >3 drinks/day or >7 drinks/week? No       Last PSA:   PSA   Date Value Ref Range Status   07/10/2018 1.8 0.0 - 3.5 ng/mL Final       Reviewed orders with patient. Reviewed health maintenance and updated orders accordingly - Yes      Reviewed and updated as needed this visit by clinical staff    Allergies  Meds              Reviewed and updated as needed this visit by Provider                     Review of Systems   Constitutional: Negative for chills and fever.   HENT: Positive for hearing loss. Negative for congestion, ear pain and sore throat.    Eyes: Positive for pain. Negative for visual disturbance.   Respiratory: Negative for cough and shortness of breath.    Cardiovascular: Negative for chest pain, palpitations and peripheral edema.   Gastrointestinal: Negative for abdominal pain, constipation, diarrhea, heartburn, hematochezia and nausea.   Genitourinary: Positive for frequency and urgency. Negative for dysuria, genital sores, hematuria, impotence and penile discharge.   Musculoskeletal: Negative for arthralgias, joint swelling and myalgias.   Skin: Negative for rash.   Neurological: Positive for paresthesias. Negative for dizziness, weakness and headaches.   Psychiatric/Behavioral: Negative for mood changes. The patient is not nervous/anxious.    All other systems reviewed and are negative.    CONSTITUTIONAL: NEGATIVE for fever, chills, change in weight  INTEGUMENTARY/SKIN: NEGATIVE for worrisome rashes,  "moles or lesions  EYES: NEGATIVE for vision changes or irritation  ENT: NEGATIVE for ear, mouth and throat problems  RESP: NEGATIVE for significant cough or SOB  CV: NEGATIVE for chest pain, palpitations or peripheral edema  GI: NEGATIVE for nausea, abdominal pain, heartburn, or change in bowel habits   male: negative for dysuria, hematuria, decreased urinary stream, erectile dysfunction, urethral discharge  MUSCULOSKELETAL: NEGATIVE for significant arthralgias or myalgia  NEURO: NEGATIVE for weakness, dizziness or paresthesias  PSYCHIATRIC: NEGATIVE for changes in mood or affect    OBJECTIVE:   /86 (BP Location: Right arm, Patient Position: Sitting, Cuff Size: Adult Regular)   Pulse 58   Resp 18   Ht 1.753 m (5' 9\")   Wt 83.5 kg (184 lb 1.9 oz)   SpO2 98%   BMI 27.19 kg/m      Physical Exam          ASSESSMENT/PLAN:   (Z00.00) Routine history and physical examination of adult  (primary encounter diagnosis)  Comment:   Plan:     (H00.015) Hordeolum externum of left lower eyelid  Comment:   Plan:     (L57.0) Actinic keratosis  Comment:   Plan: DESTRUCT PREMALIGNANT LESION, 2-14                  COUNSELING:   Reviewed preventive health counseling, as reflected in patient instructions       Regular exercise       Healthy diet/nutrition       Pneumococcal Vaccine         BMI:   Estimated body mass index is 27.19 kg/m  as calculated from the following:    Height as of this encounter: 1.753 m (5' 9\").    Weight as of this encounter: 83.5 kg (184 lb 1.9 oz).   Weight management plan: Discussed healthy diet and exercise guidelines      He reports that he has never smoked. He has never been exposed to tobacco smoke. He has never used smokeless tobacco.        Darius Rhoades MD  [facility]  "

## 2023-06-09 ENCOUNTER — OFFICE VISIT (OUTPATIENT)
Dept: FAMILY MEDICINE | Facility: CLINIC | Age: 58
End: 2023-06-09
Payer: COMMERCIAL

## 2023-06-09 VITALS
OXYGEN SATURATION: 98 % | TEMPERATURE: 98.1 F | HEART RATE: 66 BPM | SYSTOLIC BLOOD PRESSURE: 124 MMHG | DIASTOLIC BLOOD PRESSURE: 81 MMHG

## 2023-06-09 DIAGNOSIS — Z01.818 PREOP GENERAL PHYSICAL EXAM: Primary | ICD-10-CM

## 2023-06-09 DIAGNOSIS — S86.012D RUPTURE OF LEFT ACHILLES TENDON, SUBSEQUENT ENCOUNTER: ICD-10-CM

## 2023-06-09 DIAGNOSIS — D86.85 CARDIAC SARCOIDOSIS: ICD-10-CM

## 2023-06-09 PROCEDURE — 99214 OFFICE O/P EST MOD 30 MIN: CPT | Mod: GC

## 2023-06-09 PROCEDURE — 93000 ELECTROCARDIOGRAM COMPLETE: CPT | Mod: GC

## 2023-06-09 NOTE — PROGRESS NOTES
[facility] [address] [place] [address]  Phone: [phone]  Fax: [phone]  Primary Provider: Darius Rhoades  Pre-op Performing Provider: LUDWIN AARON      PREOPERATIVE EVALUATION:  Today's date: 6/9/2023    Danie Newell is a 58 year old male who presents for a preoperative evaluation.    Surgical Information:  Surgery/Procedure: left heel, achilles tendon repair  Surgery Location: [facility]  Surgeon: Albaro Rocha  Surgery Date: 6/15/23  Time of Surgery: unknown  Where patient plans to recover: At home with family  Fax number for surgical facility:[facility]     Assessment & Plan     The proposed surgical procedure is considered LOW risk.    Preop general physical exam  Upcoming left Achilles tendon repair following rupture.  Chronic medical conditions addressed discussed below.  Medically optimized for surgery.  - EKG 12-lead complete w/read - Clinics    Rupture of left Achilles tendon, subsequent encounter  Ruptured while playing volleyball with family over the weekend.  In a walking boot, using crutches.  Pain well controlled.  Follow-up for surgery next week.    Cardiac sarcoidosis  Diagnosed in 2019.  Currently in remission.  Most recent echocardiogram about a year ago with normal LVEF of 63% without wall motion abnormalities.  EKG without any ischemic changes.  No ICD discharges.  Asymptomatic from a cardiac standpoint.  Currently on methotrexate once weekly, has not taken for about 5 weeks.  Recommended reach out to his rheumatologist to help determine if to be advisable to take a dose prior to surgery vs avoid due to increased risk of infection.  - EKG 12-lead complete w/read - Clinics  -Follow-up with [facility]     Implanted Device:   - Type of device: ICD.        - No identified additional risk factors other than previously addressed    Antiplatelet or Anticoagulation Medication Instructions:   - Patient is on no antiplatelet or anticoagulation  medications.    Additional Medication Instructions:  Continue/hold methotrexate prior to surgery pending endocrnology recommendation    RECOMMENDATION:  APPROVAL GIVEN to proceed with proposed procedure, without further diagnostic evaluation.    Subjective       HPI related to upcoming procedure:   Patient ruptured left Achilles tendon while playing volleyball this past Sunday.  Upcoming surgery scheduled for next Thursday.  Currently in a walking boot, using crutches.          6/9/2023    10:03 AM   Preop Questions   1. Have you ever had a heart attack or stroke? No   2. Have you ever had surgery on your heart or blood vessels, such as a stent placement, a coronary artery bypass, or surgery on an artery in your head, neck, heart, or legs? No   3. Do you have chest pain with activity? No   4. Do you have a history of  heart failure? No   5. Do you currently have a cold, bronchitis or symptoms of other infection? No   6. Do you have a cough, shortness of breath, or wheezing? No   7. Do you or anyone in your family have previous history of blood clots? YES - DVT with PE in the setting of COVID. Was on anticoagulation for 12 months   8. Do you or does anyone in your family have a serious bleeding problem such as prolonged bleeding following surgeries or cuts? No   9. Have you ever had problems with anemia or been told to take iron pills? No   10. Have you had any abnormal blood loss such as black, tarry or bloody stools? No   11. Have you ever had a blood transfusion? No   12. Are you willing to have a blood transfusion if it is medically needed before, during, or after your surgery? Yes   13. Have you or any of your relatives ever had problems with anesthesia? No   14. Do you have sleep apnea, excessive snoring or daytime drowsiness? No   15. Do you have any artifical heart valves or other implanted medical devices like a pacemaker, defibrillator, or continuous glucose monitor? YES - Has ICD with cardiac sarcoidosis    15a. What type of device do you have? ICD   15b. Name of the clinic that manages your device:  [facility]   16. Do you have artificial joints? No   17. Are you allergic to latex? No       Health Care Directive:  Patient does not have a Health Care Directive or Living Will: Patient states has Advance Directive and will bring in a copy to clinic.    Preoperative Review of :   reviewed - no record of controlled substances prescribed.      Cardiatc sarcoidosis  -History of cardiac sarcoidosis diagnosed in 2019.  Currently on methotrexate weekly injections.  Has not taken this for about 5 weeks in the setting of recent eye infection.  Follows with rheumatology. Has an ICD in place. Has never had a discharge.  He is currently in remission, with most recent echocardiogram about a year ago with normal cardiac function.  Has upcoming follow-up at [facility] for reevaluation in the next couple of weeks.      Review of Systems  Constitutional, neuro, ENT, endocrine, pulmonary, cardiac, gastrointestinal, genitourinary, musculoskeletal, integument and psychiatric systems are negative, except as otherwise noted.    Patient Active Problem List    Diagnosis Date Noted     Left inguinal hernia 10/30/2020     Priority: Medium     Sensorineural hearing loss (SNHL) 10/30/2020     Priority: Medium     At risk for prolonged QT interval syndrome 07/20/2020     Priority: Medium     On sotalol 7/2020       Coronavirus infection 03/13/2020     Priority: Medium     Presence of automatic (implantable) cardiac defibrillator 10/15/2019     Priority: Medium     Status post dual-chamber ICD implant October 15, 2019.       Arrhythmia, ventricular 10/15/2019     Priority: Medium     Status post electrophysiology study October 15, 2019 with nonsustained ventricular tachycardia and ventricular flutter induced.       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     Priority: Medium     Benign prostatic hyperplasia with urinary obstruction 03/01/2016     Priority: Medium     Health Care Home 03/25/2013     Priority: Medium     Tier Level: 1    DX V65.8 REPLACED WITH [id number] HEALTH CARE HOME (04/08/2013)        Past Medical History:   Diagnosis Date     Acute pulmonary embolism with acute cor pulmonale (H) 3/26/2020     AK (actinic keratosis) 6/8/2017     Benign non-nodular prostatic hyperplasia with lower urinary tract symptoms 3/1/2016     Calcified lymph nodes 7/18/2019    consistent w/ Sarcoid - 2016     Cardiac sarcoidosis 05/25/2021     Family history of breast cancer     Mother and Grandmother     H/O skin graft 1993    fingers     H/O vasectomy      Hemoptysis 3/13/2020     History of pulmonary embolism 7/21/2020     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     Past Surgical History:   Procedure Laterality Date     GENITOURINARY SURGERY      Vasectomy     HERNIA REPAIR       SOFT TISSUE SURGERY      Skin Graft fingers     VASECTOMY       Current Outpatient Medications   Medication Sig Dispense Refill     BINAXNOW COVID-19 AG HOME TEST KIT TEST AS DIRECTED TODAY       cholecalciferol 25 MCG (1000 UT) TABS Take 1,000 Units by mouth daily       erythromycin (ROMYCIN) 5 MG/GM ophthalmic ointment        folic acid (FOLVITE) 1 MG tablet Take 1 mg by mouth daily       methotrexate 50 MG/2ML injection INJECT 0.9 ML UNDER THE SKIN ONCE A WEEK       methotrexate sodium, pres-free, 50 MG/2ML SOLN injection Inject 25 mg Subcutaneous once a week         No Known Allergies     Social History     Tobacco Use     Smoking status: Never     Passive exposure: Never     Smokeless tobacco: Never   Vaping Use     Vaping status: Never Used   Substance Use Topics     Alcohol use: Yes     Alcohol/week: 0.0 standard drinks of alcohol     Comment: 3-4 drinks/week     History   Drug Use No         Objective     There were no vitals taken for  this visit.    Physical Exam    GENERAL APPEARANCE: healthy, alert and no distress     EYES: EOMI,  PERRL     HENT: ear canals normal and nose and mouth without ulcers or lesions     NECK: no adenopathy, no asymmetry, masses, or scars and thyroid normal to palpation     RESP: lungs clear to auscultation - no rales, rhonchi or wheezes     CV: regular rates and rhythm, normal S1 S2, no S3 or S4 and no murmur, click or rub     ABDOMEN:  soft, nontender, no HSM or masses and bowel sounds normal     MS: extremities normal- no gross deformities noted, no evidence of inflammation in joints.  Left lower extremity in a walking boot, did not evaluate.     SKIN: no suspicious lesions or rashes     NEURO: Normal strength and tone, sensory exam grossly normal, mentation intact and speech normal     PSYCH: mentation appears normal. and affect normal/bright     LYMPHATICS: No cervical adenopathy    Recent Labs   Lab Test 11/01/22  1012   HGB 15.4         POTASSIUM 4.4   CR 0.93        Diagnostics:  Hemoglobin on 3/6/2023 -15.5.  Platelets 198  Creatinine on 3/16/2023 -  1.17 with a GFR of 71.      EKG required for History of cardiac sarcoidosis and not completed in the last 90 days.    EKG interpretation -normal sinus rhythm.  Left axis deviation.  No evidence of acute ischemia.    Revised Cardiac Risk Index (RCRI):  The patient has the following serious cardiovascular risks for perioperative complications:   - No serious cardiac risks = 0 points     RCRI Interpretation: 0 points: Class I (very low risk - 0.4% complication rate)           Signed Electronically by: Rogelio Bueno MD  Copy of this evaluation report is provided to requesting physician.

## 2023-06-09 NOTE — PATIENT INSTRUCTIONS
For informational purposes only. Not to replace the advice of your health care provider. Copyright   2003,  [employer] [facility]. All rights reserved. Clinically reviewed by Luz Burrell MD. [employer] [id number] - REV .  Preparing for Your Surgery  Getting started  A nurse will call you to review your health history and instructions. They will give you an arrival time based on your scheduled surgery time. Please be ready to share:    Your doctor's clinic name and phone number    Your medical, surgical, and anesthesia history    A list of allergies and sensitivities    A list of medicines, including herbal treatments and over-the-counter drugs    Whether the patient has a legal guardian (ask how to send us the papers in advance)  Please tell us if you're pregnant--or if there's any chance you might be pregnant. Some surgeries may injure a fetus (unborn baby), so they require a pregnancy test. Surgeries that are safe for a fetus don't always need a test, and you can choose whether to have one.   If you have a child who's having surgery, please ask for a copy of Preparing for Your Child's Surgery.    Preparing for surgery    Within 10 to 30 days of surgery: Have a pre-op exam (sometimes called an H&P, or History and Physical). This can be done at a clinic or pre-operative center.  ? If you're having a , you may not need this exam. Talk to your care team.    At your pre-op exam, talk to your care team about all medicines you take. If you need to stop any medicines before surgery, ask when to start taking them again.  ? We do this for your safety. Many medicines can make you bleed too much during surgery. Some change how well surgery (anesthesia) drugs work.    Call your insurance company to let them know you're having surgery. (If you don't have insurance, call [phone].)    Call your clinic if there's any change in your health. This includes signs of a cold or flu (sore throat, runny nose,  cough, rash, fever). It also includes a scrape or scratch near the surgery site.    If you have questions on the day of surgery, call your hospital or surgery center.  Eating and drinking guidelines  For your safety: Unless your surgeon tells you otherwise, follow the guidelines below.    Eat and drink as usual until 8 hours before you arrive for surgery. After that, no food or milk.    Drink clear liquids until 2 hours before you arrive. These are liquids you can see through, like water, Gatorade, and Propel Water. They also include plain black coffee and tea (no cream or milk), candy, and breath mints. You can spit out gum when you arrive.    If you drink alcohol: Stop drinking it the night before surgery.    If your care team tells you to take medicine on the morning of surgery, it's okay to take it with a sip of water.  Preventing infection    Shower or bathe the night before and morning of your surgery. Follow the instructions your clinic gave you. (If no instructions, use regular soap.)    Don't shave or clip hair near your surgery site. We'll remove the hair if needed.    Don't smoke or vape the morning of surgery. You may chew nicotine gum up to 2 hours before surgery. A nicotine patch is okay.  ? Note: Some surgeries require you to completely quit smoking and nicotine. Check with your surgeon.    Your care team will make every effort to keep you safe from infection. We will:  ? Clean our hands often with soap and water (or an alcohol-based hand rub).  ? Clean the skin at your surgery site with a special soap that kills germs.  ? Give you a special gown to keep you warm. (Cold raises the risk of infection.)  ? Wear special hair covers, masks, gowns and gloves during surgery.  ? Give antibiotic medicine, if prescribed. Not all surgeries need antibiotics.  What to bring on the day of surgery    Photo ID and insurance card    Copy of your health care directive, if you have one    Glasses and hearing aids (bring  cases)  ? You can't wear contacts during surgery    Inhaler and eye drops, if you use them (tell us about these when you arrive)    CPAP machine or breathing device, if you use them    A few personal items, if spending the night    If you have . . .  ? A pacemaker, ICD (cardiac defibrillator) or other implant: Bring the ID card.  ? An implanted stimulator: Bring the remote control.  ? A legal guardian: Bring a copy of the certified (court-stamped) guardianship papers.  Please remove any jewelry, including body piercings. Leave jewelry and other valuables at home.  If you're going home the day of surgery    You must have a responsible adult drive you home. They should stay with you overnight as well.    If you don't have someone to stay with you, and you aren't safe to go home alone, we may keep you overnight. Insurance often won't pay for this.  After surgery  If it's hard to control your pain or you need more pain medicine, please call your surgeon's office.  Questions?   If you have any questions for your care team, list them here: _________________________________________________________________________________________________________________________________________________________________________ ____________________________________ ____________________________________ ____________________________________

## 2023-06-09 NOTE — PROGRESS NOTES
I have personally reviewed the history and examination as documented by Dr. Bueno.  I was present during key portions of the visit and agree with the assessment and plan as documented for 58 yr old male with cardiac sarcoidosis s/p ICD placement here for preop clearance for achilles surgery. EKG reviewed and I agree w/ interpretation in note. Clearance provided.  Precautions given. Anticipatory guidance given.     Den Richardson MD  June 9, 2023  10:35 AM

## 2023-06-15 ENCOUNTER — HOSPITAL ENCOUNTER (OUTPATIENT)
Facility: CLINIC | Age: 58
Discharge: HOME | End: 2023-06-15
Attending: PODIATRIST | Admitting: PODIATRIST
Payer: COMMERCIAL

## 2023-06-15 ENCOUNTER — ANESTHESIA (OUTPATIENT)
Dept: SURGERY | Facility: CLINIC | Age: 58
End: 2023-06-15
Payer: COMMERCIAL

## 2023-06-15 ENCOUNTER — ANESTHESIA EVENT (OUTPATIENT)
Dept: SURGERY | Facility: CLINIC | Age: 58
End: 2023-06-15
Payer: COMMERCIAL

## 2023-06-15 VITALS
TEMPERATURE: 97.9 F | HEART RATE: 77 BPM | OXYGEN SATURATION: 96 % | RESPIRATION RATE: 14 BRPM | WEIGHT: 184 LBS | HEIGHT: 69 IN | BODY MASS INDEX: 27.25 KG/M2 | DIASTOLIC BLOOD PRESSURE: 79 MMHG | SYSTOLIC BLOOD PRESSURE: 143 MMHG

## 2023-06-15 DIAGNOSIS — Z98.890 S/P FOOT SURGERY: Primary | ICD-10-CM

## 2023-06-15 LAB — GLUCOSE BLDC GLUCOMTR-MCNC: 81 MG/DL (ref 70–99)

## 2023-06-15 PROCEDURE — 370N000017 HC ANESTHESIA TECHNICAL FEE, PER MIN: Performed by: PODIATRIST

## 2023-06-15 PROCEDURE — 272N000001 HC OR GENERAL SUPPLY STERILE: Performed by: PODIATRIST

## 2023-06-15 PROCEDURE — 82962 GLUCOSE BLOOD TEST: CPT

## 2023-06-15 PROCEDURE — 250N000011 HC RX IP 250 OP 636: Performed by: NURSE ANESTHETIST, CERTIFIED REGISTERED

## 2023-06-15 PROCEDURE — 250N000013 HC RX MED GY IP 250 OP 250 PS 637: Performed by: ANESTHESIOLOGY

## 2023-06-15 PROCEDURE — 258N000003 HC RX IP 258 OP 636: Performed by: ANESTHESIOLOGY

## 2023-06-15 PROCEDURE — 710N000012 HC RECOVERY PHASE 2, PER MINUTE: Performed by: PODIATRIST

## 2023-06-15 PROCEDURE — 250N000011 HC RX IP 250 OP 636: Performed by: ANESTHESIOLOGY

## 2023-06-15 PROCEDURE — 250N000009 HC RX 250: Performed by: PODIATRIST

## 2023-06-15 PROCEDURE — 360N000076 HC SURGERY LEVEL 3, PER MIN: Performed by: PODIATRIST

## 2023-06-15 PROCEDURE — 250N000011 HC RX IP 250 OP 636: Performed by: PODIATRIST

## 2023-06-15 PROCEDURE — 250N000009 HC RX 250: Performed by: ANESTHESIOLOGY

## 2023-06-15 PROCEDURE — 999N000141 HC STATISTIC PRE-PROCEDURE NURSING ASSESSMENT: Performed by: PODIATRIST

## 2023-06-15 RX ORDER — OXYCODONE HYDROCHLORIDE 5 MG/1
TABLET ORAL
Qty: 16 TABLET | Refills: 0 | Status: SHIPPED | OUTPATIENT
Start: 2023-06-15 | End: 2023-07-11

## 2023-06-15 RX ORDER — MAGNESIUM HYDROXIDE 1200 MG/15ML
LIQUID ORAL PRN
Status: DISCONTINUED | OUTPATIENT
Start: 2023-06-15 | End: 2023-06-15 | Stop reason: HOSPADM

## 2023-06-15 RX ORDER — HALOPERIDOL 5 MG/ML
1 INJECTION INTRAMUSCULAR
Status: DISCONTINUED | OUTPATIENT
Start: 2023-06-15 | End: 2023-06-15 | Stop reason: HOSPADM

## 2023-06-15 RX ORDER — CEFAZOLIN SODIUM/WATER 2 G/20 ML
2 SYRINGE (ML) INTRAVENOUS SEE ADMIN INSTRUCTIONS
Status: DISCONTINUED | OUTPATIENT
Start: 2023-06-15 | End: 2023-06-15 | Stop reason: HOSPADM

## 2023-06-15 RX ORDER — HYDROMORPHONE HCL IN WATER/PF 6 MG/30 ML
0.4 PATIENT CONTROLLED ANALGESIA SYRINGE INTRAVENOUS EVERY 5 MIN PRN
Status: DISCONTINUED | OUTPATIENT
Start: 2023-06-15 | End: 2023-06-15 | Stop reason: HOSPADM

## 2023-06-15 RX ORDER — MAGNESIUM SULFATE 4 G/50ML
4 INJECTION INTRAVENOUS ONCE
Status: COMPLETED | OUTPATIENT
Start: 2023-06-15 | End: 2023-06-15

## 2023-06-15 RX ORDER — CEFAZOLIN SODIUM/WATER 2 G/20 ML
2 SYRINGE (ML) INTRAVENOUS
Status: COMPLETED | OUTPATIENT
Start: 2023-06-15 | End: 2023-06-15

## 2023-06-15 RX ORDER — ACETAMINOPHEN 325 MG/1
975 TABLET ORAL ONCE
Status: COMPLETED | OUTPATIENT
Start: 2023-06-15 | End: 2023-06-15

## 2023-06-15 RX ORDER — MEPERIDINE HYDROCHLORIDE 25 MG/ML
12.5 INJECTION INTRAMUSCULAR; INTRAVENOUS; SUBCUTANEOUS EVERY 5 MIN PRN
Status: DISCONTINUED | OUTPATIENT
Start: 2023-06-15 | End: 2023-06-15 | Stop reason: HOSPADM

## 2023-06-15 RX ORDER — FENTANYL CITRATE 50 UG/ML
25-100 INJECTION, SOLUTION INTRAMUSCULAR; INTRAVENOUS
Status: DISCONTINUED | OUTPATIENT
Start: 2023-06-15 | End: 2023-06-15 | Stop reason: HOSPADM

## 2023-06-15 RX ORDER — CEFAZOLIN SODIUM/WATER 2 G/20 ML
2 SYRINGE (ML) INTRAVENOUS
Status: DISCONTINUED | OUTPATIENT
Start: 2023-06-15 | End: 2023-06-15 | Stop reason: HOSPADM

## 2023-06-15 RX ORDER — SODIUM CHLORIDE, SODIUM LACTATE, POTASSIUM CHLORIDE, CALCIUM CHLORIDE 600; 310; 30; 20 MG/100ML; MG/100ML; MG/100ML; MG/100ML
INJECTION, SOLUTION INTRAVENOUS CONTINUOUS
Status: DISCONTINUED | OUTPATIENT
Start: 2023-06-15 | End: 2023-06-15 | Stop reason: HOSPADM

## 2023-06-15 RX ORDER — PROPOFOL 10 MG/ML
INJECTION, EMULSION INTRAVENOUS
Status: DISCONTINUED
Start: 2023-06-15 | End: 2023-06-15 | Stop reason: HOSPADM

## 2023-06-15 RX ORDER — FENTANYL CITRATE 50 UG/ML
50 INJECTION, SOLUTION INTRAMUSCULAR; INTRAVENOUS EVERY 5 MIN PRN
Status: DISCONTINUED | OUTPATIENT
Start: 2023-06-15 | End: 2023-06-15 | Stop reason: HOSPADM

## 2023-06-15 RX ORDER — OXYCODONE HYDROCHLORIDE 5 MG/1
5 TABLET ORAL
Status: DISCONTINUED | OUTPATIENT
Start: 2023-06-15 | End: 2023-06-15 | Stop reason: HOSPADM

## 2023-06-15 RX ORDER — PROPOFOL 10 MG/ML
INJECTION, EMULSION INTRAVENOUS CONTINUOUS PRN
Status: DISCONTINUED | OUTPATIENT
Start: 2023-06-15 | End: 2023-06-15

## 2023-06-15 RX ORDER — MULTIVIT WITH MINERALS/LUTEIN
1000 TABLET ORAL DAILY
COMMUNITY

## 2023-06-15 RX ORDER — FENTANYL CITRATE 50 UG/ML
25 INJECTION, SOLUTION INTRAMUSCULAR; INTRAVENOUS EVERY 5 MIN PRN
Status: DISCONTINUED | OUTPATIENT
Start: 2023-06-15 | End: 2023-06-15 | Stop reason: HOSPADM

## 2023-06-15 RX ORDER — HYDROXYZINE PAMOATE 25 MG/1
25 CAPSULE ORAL 4 TIMES DAILY PRN
Qty: 16 CAPSULE | Refills: 0 | Status: SHIPPED | OUTPATIENT
Start: 2023-06-15 | End: 2023-07-11

## 2023-06-15 RX ORDER — DIPHENHYDRAMINE HYDROCHLORIDE 50 MG/ML
25 INJECTION INTRAMUSCULAR; INTRAVENOUS EVERY 6 HOURS PRN
Status: DISCONTINUED | OUTPATIENT
Start: 2023-06-15 | End: 2023-06-15 | Stop reason: HOSPADM

## 2023-06-15 RX ORDER — HYDROMORPHONE HCL IN WATER/PF 6 MG/30 ML
0.2 PATIENT CONTROLLED ANALGESIA SYRINGE INTRAVENOUS EVERY 5 MIN PRN
Status: DISCONTINUED | OUTPATIENT
Start: 2023-06-15 | End: 2023-06-15 | Stop reason: HOSPADM

## 2023-06-15 RX ORDER — ONDANSETRON 4 MG/1
4 TABLET, ORALLY DISINTEGRATING ORAL
Status: DISCONTINUED | OUTPATIENT
Start: 2023-06-15 | End: 2023-06-15 | Stop reason: HOSPADM

## 2023-06-15 RX ORDER — DIPHENHYDRAMINE HCL 25 MG
25 CAPSULE ORAL EVERY 6 HOURS PRN
Status: DISCONTINUED | OUTPATIENT
Start: 2023-06-15 | End: 2023-06-15 | Stop reason: HOSPADM

## 2023-06-15 RX ORDER — ONDANSETRON 4 MG/1
4 TABLET, ORALLY DISINTEGRATING ORAL EVERY 30 MIN PRN
Status: DISCONTINUED | OUTPATIENT
Start: 2023-06-15 | End: 2023-06-15 | Stop reason: HOSPADM

## 2023-06-15 RX ORDER — LIDOCAINE 40 MG/G
CREAM TOPICAL
Status: DISCONTINUED | OUTPATIENT
Start: 2023-06-15 | End: 2023-06-15 | Stop reason: HOSPADM

## 2023-06-15 RX ORDER — ONDANSETRON 2 MG/ML
4 INJECTION INTRAMUSCULAR; INTRAVENOUS EVERY 30 MIN PRN
Status: DISCONTINUED | OUTPATIENT
Start: 2023-06-15 | End: 2023-06-15 | Stop reason: HOSPADM

## 2023-06-15 RX ADMIN — SODIUM CHLORIDE, POTASSIUM CHLORIDE, SODIUM LACTATE AND CALCIUM CHLORIDE: 600; 310; 30; 20 INJECTION, SOLUTION INTRAVENOUS at 08:38

## 2023-06-15 RX ADMIN — ACETAMINOPHEN 975 MG: 325 TABLET ORAL at 08:21

## 2023-06-15 RX ADMIN — MAGNESIUM SULFATE HEPTAHYDRATE 4 G: 4 INJECTION, SOLUTION INTRAVENOUS at 08:38

## 2023-06-15 RX ADMIN — PROPOFOL 200 MCG/KG/MIN: 10 INJECTION, EMULSION INTRAVENOUS at 10:17

## 2023-06-15 RX ADMIN — MIDAZOLAM HYDROCHLORIDE 2 MG: 1 INJECTION, SOLUTION INTRAMUSCULAR; INTRAVENOUS at 08:52

## 2023-06-15 RX ADMIN — FENTANYL CITRATE 100 MCG: 50 INJECTION, SOLUTION INTRAMUSCULAR; INTRAVENOUS at 08:52

## 2023-06-15 RX ADMIN — ROPIVACAINE HYDROCHLORIDE 15 ML: 2 INJECTION, SOLUTION EPIDURAL; INFILTRATION at 08:53

## 2023-06-15 RX ADMIN — Medication 2 G: at 10:16

## 2023-06-15 ASSESSMENT — ACTIVITIES OF DAILY LIVING (ADL)
ADLS_ACUITY_SCORE: 21
ADLS_ACUITY_SCORE: 35
ADLS_ACUITY_SCORE: 22

## 2023-06-15 NOTE — ANESTHESIA PROCEDURE NOTES
"Popliteal Procedure Note    Pre-Procedure   Staff -        Anesthesiologist:  Donaldo Maza MD       Performed By: anesthesiologist       Location: pre-op       Procedure Start/Stop Times: 6/15/2023 8:56 AM and 6/15/2023 9:00 AM       Pre-Anesthestic Checklist: patient identified, IV checked, site marked, risks and benefits discussed, informed consent, monitors and equipment checked, pre-op evaluation, at physician/surgeon's request and post-op pain management  Timeout:       Correct Patient: Yes        Correct Procedure: Yes        Correct Site: Yes        Correct Position: Yes        Correct Laterality: Yes        Site Marked: Yes  Procedure Documentation  Procedure: Popliteal       Diagnosis: REQUESTED BY SURGEON FOR POSTOP PAIN       Laterality: left       Patient Position: supine       Patient Prep/Sterile Barriers: sterile gloves, mask       Skin prep: Chloraprep       Needle Type: other (echogenic)       Needle Gauge: 20.        Needle Length (Inches): 4        Ultrasound guided       1. Ultrasound was used to identify targeted nerve, plexus, vascular marker, or fascial plane and place a needle adjacent to it in real-time.       2. Ultrasound was used to visualize the spread of anesthetic in close proximity to the above referenced structure.       3. A permanent image is entered into the patient's record.       4. The visualized anatomic structures appeared normal.       5. There were no apparent abnormal pathologic findings.    Assessment/Narrative         The placement was negative for: blood aspirated, painful injection and site bleeding       Paresthesias: No.       Complications: none    Medication(s) Administered   Ropivacaine 0.5% w/ 1:400K Epi (Injection) - Injection   25 mL - 6/15/2023 9:00:00 AM  Medication Administration Time: 6/15/2023 8:56 AM      FOR [facility] ([facility]) ONLY:   Pain Team Contact information: please page the Pain Team Via [email or web address]. Search \"Pain\". During daytime hours, please page " the attending first. At night please page the resident first.

## 2023-06-15 NOTE — ANESTHESIA CARE TRANSFER NOTE
Patient: Danie Newell    Procedure: Procedure(s):  LEFT ACHILLES TENDON RUPTURE REPAIR       Diagnosis: Injury of left ankle [S99.912A]  Diagnosis Additional Information: No value filed.    Anesthesia Type:   General     Note:      Level of Consciousness: drowsy  Oxygen Supplementation: face mask  Level of Supplemental Oxygen (L/min / FiO2): 8  Independent Airway: airway patency satisfactory and stable  Dentition: dentition unchanged  Vital Signs Stable: post-procedure vital signs reviewed and stable  Report to RN Given: handoff report given  Patient transferred to: Phase II    Handoff Report: Identifed the Patient, Identified the Reponsible Provider, Reviewed the pertinent medical history, Discussed the surgical course, Reviewed Intra-OP anesthesia mangement and issues during anesthesia, Set expectations for post-procedure period and Allowed opportunity for questions and acknowledgement of understanding      Vitals:  Vitals Value Taken Time   BP 94/55 06/15/23 1132   Temp 36.6  C (97.9  F) 06/15/23 1132   Pulse 70 06/15/23 1132   Resp 20 06/15/23 1132   SpO2 97 % 06/15/23 1132       Electronically Signed By: LIDIA CRAMER CRNA  Huma 15, 2023  11:33 AM

## 2023-06-15 NOTE — OP NOTE
Procedure Date: 06/15/2023    SURGEON:  Albaro Rocha DPM.    PREOPERATIVE DIAGNOSIS:  Achilles tendon rupture, left.    POSTOPERATIVE DIAGNOSIS:  Achilles tendon rupture, left.    PROCEDURE:  Achilles tendon rupture repair with two #2 FiberWires.    ANESTHESIA:  MAC with a popliteal nerve block.    PREOPERATIVE ANTIBIOTIC:  Ancef 2 grams.    ESTIMATED BLOOD LOSS:  Minimal.    INDICATIONS FOR PROCEDURE:  The patient is a 58-year-old male who has a history of an Achilles tendon rupture on the left side.  He is here today for repair.  We discussed the procedure, perioperative course and procedure, risks, and complications with him.  All his questions answered.  Consent was obtained.    DESCRIPTION OF PROCEDURE:  The patient was brought to the operating room and placed on the table in supine position.  The patient was administered Popliteal nerve block.  He was also administered sedation.  The foot and leg were then prepped and draped in usual sterile manner.  A thigh tourniquet was then inflated to 300 mmHg after the foot and leg were exsanguinated using an Esmarch bandage.  Attention was directed to the posteromedial distal leg where over the Achilles tendon rupture a 6 cm incision was performed. Under sharp and blunt dissection care was taken to retract all neurovascular structures.  Hemostasis was obtained as needed.  It was deepened through the paratenon.  The rupture was identified.  It was cleaned along both proximal and distal ends of the rupture site.  The wound was irrigated with sterile saline.  Using two #2 FiberWires in a modified [place] suture pattern, the proximal and distal ends were gathered.  The foot was held plantarflexed and the ends were drawn together.  Modified Saba suture pattern was utilized.  We did achilles closure with 0 Vicryl.  The paratenon was repaired with 2-0 Vicryl.  Skin repaired with 4-0 nylon.  Dry sterile compressive dressing consisting of Betadine-soaked Adaptic, 4 x 4s,  and Krinkle was applied.  He was placed into a nonweightbearing posterior splint.  The patient tolerated the procedure and anesthesia without complications.  He was given written and oral postoperative instructions and will return to clinic in 10 to 12 days for postop check.    Albaro Rocha MD        D: 06/15/2023   T: 06/15/2023   MT: [email or web address]    Name:     SHANE FALCONHilda  MRN:      [id number]        Account:        [id number]   :      1965           Procedure Date: 06/15/2023     Document: [id number]

## 2023-06-15 NOTE — ANESTHESIA POSTPROCEDURE EVALUATION
Patient: Danie Newell    Procedure: Procedure(s):  LEFT ACHILLES TENDON RUPTURE REPAIR       Anesthesia Type:  General    Note:  Disposition: Outpatient   Postop Pain Control: Uneventful            Sign Out: Well controlled pain   PONV: No   Neuro/Psych: Uneventful            Sign Out: Acceptable/Baseline neuro status   Airway/Respiratory: Uneventful            Sign Out: Acceptable/Baseline resp. status   CV/Hemodynamics: Uneventful            Sign Out: Acceptable CV status; No obvious hypovolemia; No obvious fluid overload   Other NRE: NONE   DID A NON-ROUTINE EVENT OCCUR? No           Last vitals:  Vitals Value Taken Time   BP 94/55 06/15/23 1132   Temp 36.6  C (97.9  F) 06/15/23 1132   Pulse 70 06/15/23 1132   Resp 20 06/15/23 1132   SpO2 97 % 06/15/23 1132       Electronically Signed By: Donaldo Maza MD  Huma 15, 2023  12:16 PM

## 2023-06-15 NOTE — INTERVAL H&P NOTE
"I have reviewed the surgical (or preoperative) H&P that is linked to this encounter, and examined the patient. There are no significant changes    Clinical Conditions Present on Arrival:  Clinically Significant Risk Factors Present on Admission                  # Overweight: Estimated body mass index is 27.17 kg/m  as calculated from the following:    Height as of this encounter: 1.753 m (5' 9\").    Weight as of this encounter: 83.5 kg (184 lb).       "

## 2023-06-15 NOTE — ANESTHESIA PREPROCEDURE EVALUATION
Anesthesia Pre-Procedure Evaluation    Patient: Danie Newell   MRN: [id number] : 1965        Procedure : Procedure(s):  LEFT ACHILLES TENDON RUPTURE REPAIR          Past Medical History:   Diagnosis Date     Acute pulmonary embolism with acute cor pulmonale (H) 2020     AK (actinic keratosis) 2017     Benign non-nodular prostatic hyperplasia with lower urinary tract symptoms 2016     Calcified lymph nodes 2019    consistent w/ Sarcoid - 2016     Cardiac sarcoidosis 2021     Family history of breast cancer     Mother and Grandmother     H/O skin graft 1993    fingers     H/O vasectomy      Hemoptysis 2020     History of pulmonary embolism 2020     Jerome's syndrome 2016     Pacemaker       Past Surgical History:   Procedure Laterality Date     EP ICD  2019     GENITOURINARY SURGERY      Vasectomy     HERNIA REPAIR       SOFT TISSUE SURGERY      Skin Graft fingers     VASECTOMY        Allergies   Allergen Reactions     Macadamia Nut Oil Nausea and Vomiting      Social History     Tobacco Use     Smoking status: Never     Passive exposure: Never     Smokeless tobacco: Never     Tobacco comments:     2/ cardiac sarcoidosis   Vaping Use     Vaping status: Never Used   Substance Use Topics     Alcohol use: Yes     Alcohol/week: 0.0 standard drinks of alcohol     Comment: 3-4 drinks/week      Wt Readings from Last 1 Encounters:   06/15/23 83.5 kg (184 lb)        Anesthesia Evaluation   Pt has had prior anesthetic.     No history of anesthetic complications       ROS/MED HX  ENT/Pulmonary:  - neg pulmonary ROS     Neurologic:  - neg neurologic ROS     Cardiovascular:  - neg cardiovascular ROS   (+) -----pacemaker, ICD  : Cardiac sarcoid.   METS/Exercise Tolerance: >4 METS    Hematologic:  - neg hematologic  ROS     Musculoskeletal:  - neg musculoskeletal ROS     GI/Hepatic:  - neg GI/hepatic ROS     Renal/Genitourinary:  - neg Renal ROS     Endo:  - neg endo ROS      Psychiatric/Substance Use:  - neg psychiatric ROS     Infectious Disease:  - neg infectious disease ROS     Malignancy:  - neg malignancy ROS     Other:  - neg other ROS          Physical Exam    Airway  airway exam normal      Mallampati: II    Neck ROM: full   Mouth opening: > 3 cm    Respiratory Devices and Support         Dental       (+) Minor Abnormalities - some fillings, tiny chips      Cardiovascular   cardiovascular exam normal       Rhythm and rate: regular and normal     Pulmonary   pulmonary exam normal        breath sounds clear to auscultation           OUTSIDE LABS:  CBC:   Lab Results   Component Value Date    WBC 6.0 11/01/2022    HGB 15.4 11/01/2022    HGB 14.9 03/26/2021    HCT 45.6 11/01/2022    HCT 45.4 03/26/2021     11/01/2022     BMP:   Lab Results   Component Value Date     11/01/2022    .0 11/09/2016    POTASSIUM 4.4 11/01/2022    POTASSIUM 4.2 11/09/2016    CHLORIDE 104 11/01/2022    CHLORIDE 104.0 11/09/2016    CO2 25 11/01/2022    CO2 29.0 11/09/2016    BUN 15.8 11/01/2022    BUN 15.0 11/09/2016    CR 0.93 11/01/2022    CR 0.84 03/26/2021    GLC 97 11/01/2022    GLC 98 03/26/2021     COAGS:   Lab Results   Component Value Date    PTT 59 (H) 11/22/2016     POC: No results found for: BGM, HCG, HCGS  HEPATIC:   Lab Results   Component Value Date    AST 29 11/01/2022     OTHER:   Lab Results   Component Value Date    ROBERT 10.1 (H) 11/01/2022    MAG 2.2 11/09/2016    CRP 3.3 (H) 11/22/2016    SED 12 11/22/2016       Anesthesia Plan    ASA Status:  3      Anesthesia Type: General.     - Airway: Mask Only   Induction: Propofol.           Consents    Anesthesia Plan(s) and associated risks, benefits, and realistic alternatives discussed. Questions answered and patient/representative(s) expressed understanding.    - Discussed:     - Discussed with:  Patient      - Extended Intubation/Ventilatory Support Discussed: No.      - Patient is DNR/DNI Status: No    Use of blood  products discussed: No .     Postoperative Care    Pain management: Peripheral nerve block (Single Shot).   PONV prophylaxis: Ondansetron (or other 5HT-3), Dexamethasone or Solumedrol     Comments:                Donaldo Maza MD

## 2023-06-15 NOTE — PHARMACY-ADMISSION MEDICATION HISTORY
Pharmacist completed medication history with the patient while in the [facility].  Prior to admission (PTA) med list completed and updated in the electronic medical record (EMR).  Pharmacy Note - Admission Medication History  Pertinent Provider Information: none   ______________________________________________________________________  Prior To Admission (PTA) med list completed and updated in EMR.     Medications Prior to Admission   Medication Sig Dispense Refill Last Dose     calcium citrate-vitamin D (CITRACAL) 200-6.25 MG-MCG TABS per tablet Take 1 tablet by mouth every other day   6/13/2023     cholecalciferol 25 MCG (1000 UT) TABS Take 1,000 Units by mouth daily   6/14/2023     folic acid (FOLVITE) 1 MG tablet Take 1 mg by mouth daily   6/14/2023     methotrexate 50 MG/2ML injection Inject 22.5 mg Subcutaneous every 7 days sundays   6/11/2023     methotrexate sodium, pres-free, 50 MG/2ML SOLN injection Inject 25 mg Subcutaneous once a week        vitamin C (ASCORBIC ACID) 1000 MG TABS Take 1,000 mg by mouth daily   6/14/2023         Information source(s): Patient and CareEverywhere/SureScripts  Patient was asked about OTC/herbal products specifically.  PTA med list reflects this.  Based on the pharmacist s assessment, the PTA med list information appears reliable  Allergies were reviewed, assessed, and updated with the patient.    Medications available for use during hospital stay: NONE  Thank you for the opportunity to participate in the care of this patient.    The patient was asked about OTC/herbal products specifically and the PTA med list reflects the patient's response.    Allergies were reviewed and assessed with the patient, responses were updated in the EMR.    Thank you for the opportunity to participate in the care of this patient.    Theodore Thibodeaux RPH 6/15/2023 8:16 AM

## 2023-06-15 NOTE — BRIEF OP NOTE
[facility]    Brief Operative Note    Pre-operative diagnosis: Injury of left ankle [S99.912A]  Post-operative diagnosis Same as pre-operative diagnosis    Procedure: Procedure(s):  LEFT ACHILLES TENDON RUPTURE REPAIR  Surgeon: Surgeon(s) and Role:     * Albaro Rocha DPM - Primary  Anesthesia: MAC with Block   Estimated Blood Loss: Minimal    Drains: None  Specimens: * No specimens in log *  Findings:   None.  Complications: None.  Implants: * No implants in log *

## 2023-06-15 NOTE — ANESTHESIA PROCEDURE NOTES
"Adductor canal Procedure Note    Pre-Procedure   Staff -        Anesthesiologist:  Donaldo Maza MD       Performed By: anesthesiologist       Location: pre-op       Procedure Start/Stop Times: 6/15/2023 8:50 AM and 6/15/2023 8:53 AM       Pre-Anesthestic Checklist: patient identified, IV checked, site marked, risks and benefits discussed, informed consent, monitors and equipment checked, pre-op evaluation, at physician/surgeon's request and post-op pain management  Timeout:       Correct Patient: Yes        Correct Procedure: Yes        Correct Site: Yes        Correct Position: Yes        Correct Laterality: Yes        Site Marked: Yes  Procedure Documentation  Procedure: Adductor canal       Diagnosis: REQUESTED BY SURGEON FOR POSTOP PAIN       Laterality: left       Patient Position: supine       Patient Prep/Sterile Barriers: sterile gloves, mask       Skin prep: Chloraprep       Needle Type: other (echogenic)       Needle Gauge: 20.        Needle Length (Inches): 6        Ultrasound guided       1. Ultrasound was used to identify targeted nerve, plexus, vascular marker, or fascial plane and place a needle adjacent to it in real-time.       2. Ultrasound was used to visualize the spread of anesthetic in close proximity to the above referenced structure.       3. A permanent image is entered into the patient's record.       4. The visualized anatomic structures appeared normal.       5. There were no apparent abnormal pathologic findings.    Assessment/Narrative         The placement was negative for: blood aspirated, painful injection and site bleeding       Paresthesias: No.       Complications: none    Medication(s) Administered   Ropivacaine 0.5% w/ 1:400K Epi (Injection) - Injection   15 mL - 6/15/2023 8:53:00 AM  Medication Administration Time: 6/15/2023 8:50 AM      FOR [facility] ([facility] [place]) ONLY:   Pain Team Contact information: please page the Pain Team Via QReca!. Search \"Pain\". During daytime hours, " please page the attending first. At night please page the resident first.

## 2023-07-11 ENCOUNTER — OFFICE VISIT (OUTPATIENT)
Dept: FAMILY MEDICINE | Facility: CLINIC | Age: 58
End: 2023-07-11
Payer: COMMERCIAL

## 2023-07-11 VITALS
DIASTOLIC BLOOD PRESSURE: 82 MMHG | HEART RATE: 67 BPM | SYSTOLIC BLOOD PRESSURE: 121 MMHG | TEMPERATURE: 97.4 F | RESPIRATION RATE: 18 BRPM | OXYGEN SATURATION: 97 %

## 2023-07-11 DIAGNOSIS — Z01.818 PREOP GENERAL PHYSICAL EXAM: Primary | ICD-10-CM

## 2023-07-11 PROCEDURE — 99214 OFFICE O/P EST MOD 30 MIN: CPT | Performed by: STUDENT IN AN ORGANIZED HEALTH CARE EDUCATION/TRAINING PROGRAM

## 2023-07-11 RX ORDER — CALCIUM CARBONATE/VITAMIN D3 600 MG-10
1 TABLET ORAL EVERY OTHER DAY
COMMUNITY

## 2023-07-11 NOTE — PROGRESS NOTES
[facility] [address]  [place] [address]  Phone: [phone]  Fax: [phone]  Primary Provider: Darius Rhoades  Pre-op Performing Provider: ELLY POLANCO      PREOPERATIVE EVALUATION:  Today's date: 7/11/2023    Danie Newell is a 58 year old male who presents for a preoperative evaluation.      7/11/2023     8:31 AM   Additional Questions   Roomed by hser say   Accompanied by self     Surgical Information:  Surgery/Procedure: Colonoscopy   Surgery Location: [facility]   Surgeon: Dr.John Beasley   Surgery Date: 7/14/2023  Time of Surgery: 7am  Where patient plans to recover: At home with family  Fax number for surgical facility:     Assessment & Plan     The proposed surgical procedure is considered LOW risk.    Preop general physical exam  Patient safe to proceed with colonoscopy without further testing. He is holding his xarelto. He can continue his other meds. I updated his medication list.     Medications Discontinued During This Encounter   Medication Reason     hydrOXYzine (VISTARIL) 25 MG capsule Therapy completed (No AVS)     oxyCODONE (ROXICODONE) 5 MG tablet Therapy completed (No AVS)     calcium citrate-vitamin D (CITRACAL) 200-6.25 MG-MCG TABS per tablet Alternate therapy     cholecalciferol 25 MCG (1000 UT) TABS Alternate therapy          Implanted Device:   - Type of device: Dual chamber ICD Patient advised to bring device information on day of surgery.       - No identified additional risk factors other than previously addressed    Antiplatelet or Anticoagulation Medication Instructions:   - apixaban (Eliquis), edoxaban (Savaysa), rivaroxaban (Xarelto): Bleeding risk is moderate or high for this procedure AND CrCl  (>=) 50 mL/min. HOLD 2 days before surgery.     Additional Medication Instructions:  Patient is to take all scheduled medications on the day of surgery   - ibuprofen (Advil, Motrin): HOLD 1 day before surgery.      RECOMMENDATION:  APPROVAL GIVEN to proceed with proposed procedure, without further diagnostic evaluation.    Review of prior external note(s) from - CareEverywhere information from [place] reviewed  I spent a total of 30 minutes on the day of the visit.   Time spent by me doing chart review, history and exam, documentation and further activities per the note      Subjective       HPI related to upcoming procedure: Patient is getting screening colonoscopy, last one was 10 years ago, his gastroenterologist requires one due to his dual chamber ICD.         7/10/2023     2:13 PM   Preop Questions   1. Have you ever had a heart attack or stroke? No   2. Have you ever had surgery on your heart or blood vessels, such as a stent placement, a coronary artery bypass, or surgery on an artery in your head, neck, heart, or legs? No   3. Do you have chest pain with activity? No   4. Do you have a history of  heart failure? No   5. Do you currently have a cold, bronchitis or symptoms of other infection? No   6. Do you have a cough, shortness of breath, or wheezing? No   7. Do you or anyone in your family have previous history of blood clots? YES - personal history of DVT and PE when infected with Covid in 2020   8. Do you or does anyone in your family have a serious bleeding problem such as prolonged bleeding following surgeries or cuts? No   9. Have you ever had problems with anemia or been told to take iron pills? No   10. Have you had any abnormal blood loss such as black, tarry or bloody stools? No   11. Have you ever had a blood transfusion? No   12. Are you willing to have a blood transfusion if it is medically needed before, during, or after your surgery? Yes   13. Have you or any of your relatives ever had problems with anesthesia? No   14. Do you have sleep apnea, excessive snoring or daytime drowsiness? No   15. Do you have any artifical heart valves or other implanted medical devices like a pacemaker, defibrillator,  or continuous glucose monitor? YES - Dual chamber ICD implanted in 2019   15a. What type of device do you have? [employer]   15b. Name of the clinic that manages your device:  [facility]   16. Do you have artificial joints? No   17. Are you allergic to latex? No       Health Care Directive:  Patient has a health care directive, we do not have it on file, he will bring it to clinic.     Preoperative Review of :   reviewed - controlled substances prescribed by other outside provider(s). and patient no longer taking the oxycodone from his achilles repair.      Status of Chronic Conditions:  Cardiac and extra cardiac sarcoidosis: Just recently followed up at [facility] for this. Echo was stable. Cardiology recommended decreasing methotrexate dose (currently prescribed once weekly injections 25mg). The patient had already been using less than prescribed because he recognizes the immune compromise associated with methotrexate and so if he feels unwell or has been knowingly exposed to infection, he skips a dose. He estimates maybe he takes half as much as prescribed.     Patient had a DVT/PE when he had covid at the beginning of the pandemic. He recently had an achilles repair and was put on xarelto for 30 days after this surgery to reduce risk of DVT. He has already stopped this in anticipation of his upcoming colonoscopy.     Review of Systems  CONSTITUTIONAL: NEGATIVE for fever, chills, change in weight  ENT/MOUTH: NEGATIVE for ear, mouth and throat problems  RESP: NEGATIVE for significant cough or SOB  CV: NEGATIVE for chest pain, palpitations or peripheral edema    Patient Active Problem List    Diagnosis Date Noted     Left inguinal hernia 10/30/2020     Priority: Medium     Sensorineural hearing loss (SNHL) 10/30/2020     Priority: Medium     At risk for prolonged QT interval syndrome 07/20/2020     Priority: Medium     On sotalol 7/2020       Coronavirus infection 03/13/2020     Priority: Medium      Presence of automatic (implantable) cardiac defibrillator 10/15/2019     Priority: Medium     Status post dual-chamber ICD implant October 15, 2019.       Arrhythmia, ventricular 10/15/2019     Priority: Medium     Status post electrophysiology study October 15, 2019 with nonsustained ventricular tachycardia and ventricular flutter induced.       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     Priority: Medium     Benign prostatic hyperplasia with urinary obstruction 03/01/2016     Priority: Medium     Health Care Home 03/25/2013     Priority: Medium     Tier Level: 1    DX V65.8 REPLACED WITH 12514 HEALTH CARE HOME (04/08/2013)        Past Medical History:   Diagnosis Date     Acute pulmonary embolism with acute cor pulmonale (H) 03/26/2020     AK (actinic keratosis) 06/08/2017     Benign non-nodular prostatic hyperplasia with lower urinary tract symptoms 03/01/2016     Calcified lymph nodes 07/18/2019    consistent w/ Sarcoid - 2016     Cardiac sarcoidosis 05/25/2021     Family history of breast cancer     Mother and Grandmother     H/O skin graft 1993    fingers     H/O vasectomy      Hemoptysis 03/13/2020     History of pulmonary embolism 07/21/2020     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     Pacemaker      Past Surgical History:   Procedure Laterality Date     EP ICD  2019     GENITOURINARY SURGERY      Vasectomy     HERNIA REPAIR       REPAIR TENDON ACHILLES Left 6/15/2023    Procedure: LEFT ACHILLES TENDON RUPTURE REPAIR;  Surgeon: Albaro Rocha DPM;  Location: [facility] Main OR     SOFT TISSUE SURGERY      Skin Graft fingers     VASECTOMY       Current Outpatient Medications   Medication Sig Dispense Refill     calcium carbonate-vitamin D (CALTRATE) 600-10 MG-MCG per tablet Take 1 tablet by mouth every other day       folic acid (FOLVITE) 1 MG tablet Take 1 mg by  mouth daily       methotrexate 50 MG/2ML injection Inject 22.5 mg Subcutaneous every 7 days sundays       methotrexate sodium, pres-free, 50 MG/2ML SOLN injection Inject 25 mg Subcutaneous once a week       rivaroxaban ANTICOAGULANT (XARELTO) 20 MG TABS tablet Take 1 tablet (20 mg) by mouth daily (with dinner) 28 tablet 0     vitamin C (ASCORBIC ACID) 1000 MG TABS Take 1,000 mg by mouth daily         Allergies   Allergen Reactions     Macadamia Nut Oil Nausea and Vomiting        Social History     Tobacco Use     Smoking status: Never     Passive exposure: Never     Smokeless tobacco: Never     Tobacco comments:     2/ cardiac sarcoidosis   Substance Use Topics     Alcohol use: Yes     Alcohol/week: 0.0 standard drinks of alcohol     Comment: 3-4 drinks/week     Family History   Problem Relation Age of Onset     Breast Cancer Mother      Cancer Mother      Breast Cancer Paternal Grandmother      Cancer Paternal Grandmother      Other Cancer Father 77        Waldonstrom Microglobulinemia sub type of non hodgkin lymphoma     Cancer Maternal Grandmother      C.A.D. No family hx of      Diabetes No family hx of      Hypertension No family hx of      Cancer - colorectal No family hx of      Prostate Cancer No family hx of      Coronary Artery Disease No family hx of      Hyperlipidemia No family hx of      Colon Cancer No family hx of      Anesthesia Reaction No family hx of      History   Drug Use No         Objective     /82   Pulse 67   Temp 97.4  F (36.3  C) (Tympanic)   Resp 18   SpO2 97%     Physical Exam  GENERAL APPEARANCE: healthy, alert and no distress  RESP: lungs clear to auscultation - no rales, rhonchi or wheezes  CV: regular rate and rhythm, normal S1 S2, no S3 or S4 and no murmur, click or rub   ABDOMEN: soft, nontender, no HSM or masses and bowel sounds normal  NEURO: Normal strength and tone, sensory exam grossly normal, mentation intact and speech normal  MSK: Left leg with ace wraps in place  around splint of foot and lower leg, patient using crutches to ambulate    Recent Labs   Lab Test 11/01/22  1012   HGB 15.4         POTASSIUM 4.4   CR 0.93        Diagnostics:  No labs were ordered during this visit. (He just had a full battery of labs at [facility], normal hemoglobin at that time)  No EKG this visit, completed in the last 90 days.     Revised Cardiac Risk Index (RCRI):  The patient has the following serious cardiovascular risks for perioperative complications:   - No serious cardiac risks = 0 points     RCRI Interpretation: 0 points: Class I (very low risk - 0.4% complication rate)    Of note, patient had recent elevated PSA of 8.7, this is being followed at [place] with a repeat PSA in a couple weeks. He is seeing urology there.          Signed Electronically by: Estee Garcia MD  Copy of this evaluation report is provided to requesting physician.

## 2023-07-11 NOTE — PATIENT INSTRUCTIONS
For informational purposes only. Not to replace the advice of your health care provider. Copyright   2003,  [facility]. All rights reserved. Clinically reviewed by Luz Burrell MD. [employer] [id number] - REV .  Preparing for Your Surgery  Getting started  A nurse will call you to review your health history and instructions. They will give you an arrival time based on your scheduled surgery time. Please be ready to share:    Your doctor's clinic name and phone number    Your medical, surgical, and anesthesia history    A list of allergies and sensitivities    A list of medicines, including herbal treatments and over-the-counter drugs    Whether the patient has a legal guardian (ask how to send us the papers in advance)  Please tell us if you're pregnant--or if there's any chance you might be pregnant. Some surgeries may injure a fetus (unborn baby), so they require a pregnancy test. Surgeries that are safe for a fetus don't always need a test, and you can choose whether to have one.   If you have a child who's having surgery, please ask for a copy of Preparing for Your Child's Surgery.    Preparing for surgery    Within 10 to 30 days of surgery: Have a pre-op exam (sometimes called an H&P, or History and Physical). This can be done at a clinic or pre-operative center.  ? If you're having a , you may not need this exam. Talk to your care team.    At your pre-op exam, talk to your care team about all medicines you take. If you need to stop any medicines before surgery, ask when to start taking them again.  ? We do this for your safety. Many medicines can make you bleed too much during surgery. Some change how well surgery (anesthesia) drugs work.    Call your insurance company to let them know you're having surgery. (If you don't have insurance, call [phone].)    Call your clinic if there's any change in your health. This includes signs of a cold or flu (sore throat, runny nose,  cough, rash, fever). It also includes a scrape or scratch near the surgery site.    If you have questions on the day of surgery, call your hospital or surgery center.  Eating and drinking guidelines  For your safety: Unless your surgeon tells you otherwise, follow the guidelines below.    Eat and drink as usual until 8 hours before you arrive for surgery. After that, no food or milk.    Drink clear liquids until 2 hours before you arrive. These are liquids you can see through, like water, Gatorade, and Propel Water. They also include plain black coffee and tea (no cream or milk), candy, and breath mints. You can spit out gum when you arrive.    If you drink alcohol: Stop drinking it the night before surgery.    If your care team tells you to take medicine on the morning of surgery, it's okay to take it with a sip of water.  Preventing infection    Shower or bathe the night before and morning of your surgery. Follow the instructions your clinic gave you. (If no instructions, use regular soap.)    Don't shave or clip hair near your surgery site. We'll remove the hair if needed.    Don't smoke or vape the morning of surgery. You may chew nicotine gum up to 2 hours before surgery. A nicotine patch is okay.  ? Note: Some surgeries require you to completely quit smoking and nicotine. Check with your surgeon.    Your care team will make every effort to keep you safe from infection. We will:  ? Clean our hands often with soap and water (or an alcohol-based hand rub).  ? Clean the skin at your surgery site with a special soap that kills germs.  ? Give you a special gown to keep you warm. (Cold raises the risk of infection.)  ? Wear special hair covers, masks, gowns and gloves during surgery.  ? Give antibiotic medicine, if prescribed. Not all surgeries need antibiotics.  What to bring on the day of surgery    Photo ID and insurance card    Copy of your health care directive, if you have one    Glasses and hearing aids (bring  cases)  ? You can't wear contacts during surgery    Inhaler and eye drops, if you use them (tell us about these when you arrive)    CPAP machine or breathing device, if you use them    A few personal items, if spending the night    If you have . . .  ? A pacemaker, ICD (cardiac defibrillator) or other implant: Bring the ID card.  ? An implanted stimulator: Bring the remote control.  ? A legal guardian: Bring a copy of the certified (court-stamped) guardianship papers.  Please remove any jewelry, including body piercings. Leave jewelry and other valuables at home.  If you're going home the day of surgery    You must have a responsible adult drive you home. They should stay with you overnight as well.    If you don't have someone to stay with you, and you aren't safe to go home alone, we may keep you overnight. Insurance often won't pay for this.  After surgery  If it's hard to control your pain or you need more pain medicine, please call your surgeon's office.  Questions?   If you have any questions for your care team, list them here: _________________________________________________________________________________________________________________________________________________________________________ ____________________________________ ____________________________________ ____________________________________

## 2023-08-07 ENCOUNTER — OFFICE VISIT (OUTPATIENT)
Dept: FAMILY MEDICINE | Facility: CLINIC | Age: 58
End: 2023-08-07
Payer: COMMERCIAL

## 2023-08-07 VITALS
HEART RATE: 61 BPM | SYSTOLIC BLOOD PRESSURE: 117 MMHG | DIASTOLIC BLOOD PRESSURE: 79 MMHG | OXYGEN SATURATION: 97 % | RESPIRATION RATE: 20 BRPM | BODY MASS INDEX: 27.17 KG/M2 | WEIGHT: 184 LBS

## 2023-08-07 DIAGNOSIS — Z86.711 HISTORY OF PULMONARY EMBOLISM: Primary | ICD-10-CM

## 2023-08-07 DIAGNOSIS — Z11.4 SCREENING FOR HIV (HUMAN IMMUNODEFICIENCY VIRUS): ICD-10-CM

## 2023-08-07 PROCEDURE — 99214 OFFICE O/P EST MOD 30 MIN: CPT | Performed by: FAMILY MEDICINE

## 2023-08-07 RX ORDER — OXYCODONE HYDROCHLORIDE 5 MG/1
TABLET ORAL
COMMUNITY
Start: 2023-07-23 | End: 2023-08-07

## 2023-08-07 RX ORDER — METHOTREXATE SODIUM/PF 25 MG/ML
VIAL (ML) INJECTION
COMMUNITY
End: 2024-06-18 | Stop reason: ALTCHOICE

## 2023-08-07 RX ORDER — TAMSULOSIN HYDROCHLORIDE 0.4 MG/1
0.4 CAPSULE ORAL
COMMUNITY
Start: 2023-07-05

## 2023-08-07 NOTE — PATIENT INSTRUCTIONS
Continue Eliquis 5 mg twice daily to prevent future blood clots    A new prescription with a 90-day supply has been sent to your pharmacy, with a year supply    Continue to follow your physical therapist and orthopedist recommendation and healing your Achilles tendon.  I would certainly get their advice prior to your sailing trip about any limitations or recommendations during your sailing trip.    After your prostate MRI asked the urologist to have results and recommendations sent here to our [place] office.    Your rheumatologist is transitioning from injected methotrexate to an oral regimen.  It sounds like this will continue over the next 2 years.  Continue to have records sent here to our office

## 2023-10-08 NOTE — PROGRESS NOTES
HPI:   Danie Newell is a 58 year old  male who presents for:    Chief Complaint   Patient presents with    Hospital follow up      Hospital follow up      Pulmonary embolus: Was seen in the [facility] emergency department on 7/20/2023 for pleuritic chest pain.  Chest CT showed a right lower lobe pulmonary embolus.  He was admitted to the hospital and restarted on Eliquis at treatment doses.  He has been doing well since the time of his hospitalization.  No chest pain.  Tolerating his regular physical activities as well as physical therapy.    He has a history of right lower extremity DVT and bilateral PE back in March 2020 in the setting of COVID and his sarcoid.  He was treated with Eliquis for that episode of thromboembolism and continued into the summer 2021.    He ruptured his left Achilles tendon in early June, and underwent surgery in mid June.  He was put on Eliquis prophylaxis for a month after his surgery.  After discontinuing the prophylaxis Eliquis he noticed some swelling in his left lower extremity.  He later developed severe pleuritic chest pain which prompted him to be evaluated in the [facility] emergency department as outlined above.    He has not had any new bleeding problems since restarting Eliquis during his hospitalization.  No blood in the stool or black stool.    Past medical history is significant for sarcoid, and the past history of provoked thromboembolic disease as outlined above           PMHX:     Patient Active Problem List   Diagnosis    Health Care Home    Benign prostatic hyperplasia with urinary obstruction    AK (actinic keratosis)    Jerome's syndrome    Sarcoidosis    Cardiac sarcoidosis    Presence of automatic (implantable) cardiac defibrillator    Arrhythmia, ventricular    At risk for prolonged QT interval syndrome    Coronavirus infection    Left inguinal hernia    Sensorineural hearing loss (SNHL)       Current Outpatient Medications   Medication Sig Dispense Refill     apixaban ANTICOAGULANT (ELIQUIS) 5 MG tablet Take 1 tablet (5 mg) by mouth 2 times daily 180 tablet 3    methotrexate 2.5 MG tablet       tamsulosin (FLOMAX) 0.4 MG capsule Take 0.4 mg by mouth      calcium carbonate-vitamin D (CALTRATE) 600-10 MG-MCG per tablet Take 1 tablet by mouth every other day      folic acid (FOLVITE) 1 MG tablet Take 1 mg by mouth daily      methotrexate 50 MG/2ML injection Inject 22.5 mg Subcutaneous every 7 days sundays      methotrexate sodium, PF, 50 MG/2 ML oral solution (inj used orally) Take by mouth once a week. Indications: cardiac sarcoid      methotrexate sodium, pres-free, 50 MG/2ML SOLN injection Inject 25 mg Subcutaneous once a week      vitamin C (ASCORBIC ACID) 1000 MG TABS Take 1,000 mg by mouth daily         Social History     Tobacco Use    Smoking status: Never     Passive exposure: Never    Smokeless tobacco: Never    Tobacco comments:     2/ cardiac sarcoidosis   Vaping Use    Vaping Use: Never used   Substance Use Topics    Alcohol use: Yes     Alcohol/week: 0.0 standard drinks of alcohol     Comment: 3-4 drinks/week    Drug use: No       Social History     Social History Narrative    Education: 2 years college       Allergies   Allergen Reactions    Macadamia Nut Oil Nausea and Vomiting       No results found for this or any previous visit (from the past 24 hour(s)).         Review of Systems:     General: No fevers.  No significant weight change  ENT: No upper respiratory symptoms  Respiratory: No cough or dyspnea  Cardiovascular: No change in exercise tolerance  No palpitations or racing heart  As outlined in HPI             Physical Exam:     Vitals:    08/07/23 1122   BP: 117/79   Pulse: 61   Resp: 20   SpO2: 97%   Weight: 83.5 kg (184 lb)     Body mass index is 27.17 kg/m .    General: Alert,   in no acute distress  HEENT: Head is free of trauma.   Sclerae non-icteric. PERRL, Moist oral mucus membranes, no tonsilar hypertrophy or exudate.   Resp: Clear to  auscultation bilaterally  CV: Regular rate and rhythm  Abd: Soft, non-tender.  Ext: Warm.  1+ edema left  Skin: exposed skin free of rash  Psych: Mood appropriate   Ambulates about the exam room      Assessment and Plan   Continue Eliquis 5 mg twice daily to prevent future blood clots    A new prescription with a 90-day supply has been sent to your pharmacy, with a year supply    Continue to follow your physical therapist and orthopedist recommendation and healing your Achilles tendon.  I would certainly get their advice prior to your sailing trip about any limitations or recommendations during your sailing trip.    After your prostate MRI asked the urologist to have results and recommendations sent here to our [place] office.    Your rheumatologist is transitioning from injected methotrexate to an oral regimen.  It sounds like this will continue over the next 2 years.  Continue to have records sent here to our office          1. History of pulmonary embolism  Continue Eliquis 5 mg twice a day  I gave him a 90-day supply with a years worth of refills  Although both of his thromboembolic episodes were provoked, he has ongoing risk factors and anticipate the potential for lifelong anticoagulation  He has seen a specialist in the past and plans to follow-up with them for consultation    - apixaban ANTICOAGULANT (ELIQUIS) 5 MG tablet; Take 1 tablet (5 mg) by mouth 2 times daily  Dispense: 180 tablet; Refill: 3    2. Screening for HIV (human immunodeficiency virus)  Routine screening      Follow up: 3 months, earlier as needed  He is undergoing prostate evaluation with urology, and physical therapy as well as ongoing orthopedist care.  Following with his rheumatologist to transition from injected methotrexate to an oral regimen.    Options for treatment and follow-up care were reviewed with the patient and/or guardian. Danie Newell and/or guardian engaged in the decision making process and verbalized understanding of  the options discussed and agreed with the final plan.    Aurelio Harvey MD  Faculty - [facility]

## 2023-11-06 ENCOUNTER — OFFICE VISIT (OUTPATIENT)
Dept: FAMILY MEDICINE | Facility: CLINIC | Age: 58
End: 2023-11-06
Payer: COMMERCIAL

## 2023-11-06 VITALS
DIASTOLIC BLOOD PRESSURE: 77 MMHG | BODY MASS INDEX: 27.96 KG/M2 | TEMPERATURE: 98.2 F | SYSTOLIC BLOOD PRESSURE: 120 MMHG | RESPIRATION RATE: 16 BRPM | WEIGHT: 188.75 LBS | OXYGEN SATURATION: 97 % | HEIGHT: 69 IN | HEART RATE: 60 BPM

## 2023-11-06 DIAGNOSIS — Z23 NEED FOR PROPHYLACTIC VACCINATION AND INOCULATION AGAINST INFLUENZA: ICD-10-CM

## 2023-11-06 DIAGNOSIS — Z23 HIGH PRIORITY FOR 2019-NCOV VACCINE: ICD-10-CM

## 2023-11-06 DIAGNOSIS — G56.03 BILATERAL CARPAL TUNNEL SYNDROME: Primary | ICD-10-CM

## 2023-11-06 PROCEDURE — 91320 SARSCV2 VAC 30MCG TRS-SUC IM: CPT

## 2023-11-06 PROCEDURE — 90471 IMMUNIZATION ADMIN: CPT

## 2023-11-06 PROCEDURE — 90480 ADMN SARSCOV2 VAC 1/ONLY CMP: CPT

## 2023-11-06 PROCEDURE — 99213 OFFICE O/P EST LOW 20 MIN: CPT | Mod: 25

## 2023-11-06 PROCEDURE — 90686 IIV4 VACC NO PRSV 0.5 ML IM: CPT

## 2023-11-06 NOTE — PROGRESS NOTES
Assessment & Plan     Bilateral carpal tunnel syndrome  Patient with history of carpal tunnel. Previously followed by Dr. Rhoades as PCP. Has had injections in the past with some relief. Over the past week, left hand has had some worsening of symptoms with numbness/tingling in median distribution. Has wrist braces at home, but doesn't regularly wear them. 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 Also plans to follow up at [facility] for urology with elevated PSA. Discussed options with patient to include: bracing, injection, hand therapy, carpal tunnel release surgery. Discussed options to pursue hand specialist in the [facility] vs seeing hand specialist at [place]. Talked with patient of different surgical options for carpal tunnel release to include new minimally invasive carpal tunnel release at [facility]. Patient amenable to hand consult at [place] since he is already receiving care there.   - Orthopedic  Referral; Future    Need for prophylactic vaccination and inoculation against influenza  Due for flu shot today    High priority for 2019-nCoV vaccine  Due for covid shot today.        Return if symptoms worsen or fail to improve.    Katrin Farnsworth, [facility] [place]    Anurag Gunter is a 58 year old, presenting for the following health issues:  Numbness (L hand carpal tonal ), Imm/Inj (Flu Shot), and Imm/Inj (COVID-19 VACCINE)        11/6/2023    10:33 AM   Additional Questions   Roomed by queta gonzales   Accompanied by self       HPI     Pleasant 58 year old here today for immunizations and for carpal tunnel    - due for flu and covid vaccines    Carpal Tunnel  -known history bilateral carpal tunnel  - over last week, left hand more painful  - numbness, tingling in median nerve distribution  - had injection in the past   - positive phalen, positive durkan in bilateral hands        Review of Systems   Constitutional, HEENT, cardiovascular, pulmonary, GI, ,  "musculoskeletal, neuro, skin, endocrine and psych systems are negative, except as otherwise noted.      Objective    /77   Pulse 60   Temp 98.2  F (36.8  C) (Oral)   Resp 16   Ht 1.753 m (5' 9\")   Wt 85.6 kg (188 lb 12 oz)   SpO2 97%   BMI 27.87 kg/m    Body mass index is 27.87 kg/m .  Physical Exam   GEN: Pleasant male, in no acute distress.   HEENT: Normocephalic, atraumatic.   NECK: Supple. No cervical or supraclavicular adenopathy.   PULM: Non-labored breathing. No use of accessory muscles.   EXTREMITES:  No clubbing, cyanosis, or edema.    SKIN: No rash on limited skin exam  NEURO:  Awake. Oriented to person, place, time and situation. . Moving all extremities.    PSYCH: Calm. Appropriate affect, insight, judgment.   MSK: Hand - positive Phalen, positive Durkan in bilateral hands; no evidence of thenar atrophy; Full ROM in bilateral wrist flexion/extension, radial and ulnar deviation.         ----- Service Performed and Documented by Resident or Fellow ------              "

## 2023-11-06 NOTE — PROGRESS NOTES
Preceptor Attestation:  Patient's case reviewed and discussed with the resident, Katrin Farnsworth DO, and I personally evaluated the patient. I agree with written assessment and plan of care.    Supervising Physician:  Marian Lind MD   [facility]

## 2023-11-10 DIAGNOSIS — G56.03 BILATERAL CARPAL TUNNEL SYNDROME: Primary | ICD-10-CM

## 2023-11-16 ENCOUNTER — ANCILLARY PROCEDURE (OUTPATIENT)
Dept: GENERAL RADIOLOGY | Facility: CLINIC | Age: 58
End: 2023-11-16
Attending: FAMILY MEDICINE
Payer: COMMERCIAL

## 2023-11-16 DIAGNOSIS — G56.03 BILATERAL CARPAL TUNNEL SYNDROME: ICD-10-CM

## 2023-11-16 PROCEDURE — 73100 X-RAY EXAM OF WRIST: CPT | Mod: TC | Performed by: RADIOLOGY

## 2023-11-22 ENCOUNTER — TELEPHONE (OUTPATIENT)
Dept: FAMILY MEDICINE | Facility: CLINIC | Age: 58
End: 2023-11-22
Payer: COMMERCIAL

## 2023-11-22 NOTE — TELEPHONE ENCOUNTER
Called patient and wanted to follow up on imaging and if imaging needed to be sent over to [facility]. Clarified that images are through PACS and received a fax number to also send over images.    Fax to [facility]  [phone]

## 2023-12-26 ENCOUNTER — PATIENT OUTREACH (OUTPATIENT)
Dept: CARE COORDINATION | Facility: CLINIC | Age: 58
End: 2023-12-26
Payer: COMMERCIAL

## 2023-12-26 NOTE — PROGRESS NOTES
I got a call back from the pt, pt stated that he is doing fine, now. Pt stated that he had a stye,but was given some medication for it. He is doing fine now, he did not feel that he needs a follow up.

## 2023-12-26 NOTE — PROGRESS NOTES
Clinic Care Coordination Contact  Follow Up Progress Note      Assessment: The pt was recently in the ED, I called to check up on the pt, and help the pt setup a ED follow up. The pt was at the [facility] in [place] for eyelid problem. I called the pt,but got his vm, so I left a vm for the pt to give me a call back.     Care Gaps:    Health Maintenance Due   Topic Date Due    ADVANCE CARE PLANNING  Never done    HIV SCREENING  Never done    LIPID  03/26/2018    RSV VACCINE (Pregnancy & 60+) (1 - 1-dose 60+ series) Never done    COVID-19 Vaccine (6 - 2023-24 season) 01/01/2024           Care Plans      Intervention/Education provided during outreach:               Plan:     Care Coordinator will follow up in

## 2024-06-15 SDOH — HEALTH STABILITY: PHYSICAL HEALTH: ON AVERAGE, HOW MANY DAYS PER WEEK DO YOU ENGAGE IN MODERATE TO STRENUOUS EXERCISE (LIKE A BRISK WALK)?: 4 DAYS

## 2024-06-15 SDOH — HEALTH STABILITY: PHYSICAL HEALTH: ON AVERAGE, HOW MANY MINUTES DO YOU ENGAGE IN EXERCISE AT THIS LEVEL?: 30 MIN

## 2024-06-15 ASSESSMENT — SOCIAL DETERMINANTS OF HEALTH (SDOH): HOW OFTEN DO YOU GET TOGETHER WITH FRIENDS OR RELATIVES?: MORE THAN THREE TIMES A WEEK

## 2024-06-18 ENCOUNTER — OFFICE VISIT (OUTPATIENT)
Dept: FAMILY MEDICINE | Facility: CLINIC | Age: 59
End: 2024-06-18
Payer: COMMERCIAL

## 2024-06-18 VITALS
HEIGHT: 68 IN | OXYGEN SATURATION: 96 % | RESPIRATION RATE: 20 BRPM | BODY MASS INDEX: 27.43 KG/M2 | DIASTOLIC BLOOD PRESSURE: 71 MMHG | SYSTOLIC BLOOD PRESSURE: 114 MMHG | TEMPERATURE: 97.7 F | WEIGHT: 181 LBS | HEART RATE: 58 BPM

## 2024-06-18 DIAGNOSIS — Z00.00 ROUTINE GENERAL MEDICAL EXAMINATION AT A HEALTH CARE FACILITY: ICD-10-CM

## 2024-06-18 DIAGNOSIS — D68.51 FACTOR V LEIDEN (H): ICD-10-CM

## 2024-06-18 DIAGNOSIS — Z11.4 SCREENING FOR HIV (HUMAN IMMUNODEFICIENCY VIRUS): Primary | ICD-10-CM

## 2024-06-18 DIAGNOSIS — Z00.00 ROUTINE HISTORY AND PHYSICAL EXAMINATION OF ADULT: ICD-10-CM

## 2024-06-18 PROCEDURE — 80061 LIPID PANEL: CPT | Performed by: STUDENT IN AN ORGANIZED HEALTH CARE EDUCATION/TRAINING PROGRAM

## 2024-06-18 PROCEDURE — 99396 PREV VISIT EST AGE 40-64: CPT | Performed by: STUDENT IN AN ORGANIZED HEALTH CARE EDUCATION/TRAINING PROGRAM

## 2024-06-18 PROCEDURE — 36415 COLL VENOUS BLD VENIPUNCTURE: CPT | Performed by: STUDENT IN AN ORGANIZED HEALTH CARE EDUCATION/TRAINING PROGRAM

## 2024-06-18 NOTE — PATIENT INSTRUCTIONS
"Patient Education   Preventive Care Advice   This is general advice we often give to help people stay healthy. Your care team may have specific advice just for you. Please talk to your care team about your own preventive care needs.  Lifestyle  Exercise at least 150 minutes each week (30 minutes a day, 5 days a week).  Do muscle strengthening activities 2 days a week. These help control your weight and prevent disease.  No smoking.  Wear sunscreen to prevent skin cancer.  Have your home tested for radon every 2 to 5 years. Radon is a colorless, odorless gas that can harm your lungs. To learn more, go to www.health.[facility].[place].us and search for \"Radon in Homes.\"  Keep guns unloaded and locked up in a safe place like a safe or gun vault, or, use a gun lock and hide the keys. Always lock away bullets separately. To learn more, visit [employer].[place].gov and search for \"safe gun storage.\"  Nutrition  Eat 5 or more servings of fruits and vegetables each day.  Try wheat bread, brown rice and whole grain pasta (instead of white bread, rice, and pasta).  Get enough calcium and vitamin D. Check the label on foods and aim for 100% of the RDA (recommended daily allowance).  Regular exams  Have a dental exam and cleaning every 6 months.  See your health care team every year to talk about:  Any changes in your health.  Any medicines your care team has prescribed.  Preventive care, family planning, and ways to prevent chronic diseases.  Shots (vaccines)   HPV shots (up to age 26), if you've never had them before.  Hepatitis B shots (up to age 59), if you've never had them before.  COVID-19 shot: Get this shot when it's due.  Flu shot: Get a flu shot every year.  Tetanus shot: Get a tetanus shot every 10 years.  Pneumococcal, hepatitis A, and RSV shots: Ask your care team if you need these based on your risk.  Shingles shot (for age 50 and up).  General health tests  Diabetes screening:  Starting at age 35, Get screened for diabetes at least " every 3 years.  If you are younger than age 35, ask your care team if you should be screened for diabetes.  Cholesterol test: At age 39, start having a cholesterol test every 5 years, or more often if advised.  Bone density scan (DEXA): At age 50, ask your care team if you should have this scan for osteoporosis (brittle bones).  Hepatitis C: Get tested at least once in your life.  Abdominal aortic aneurysm screening: Talk to your doctor about having this screening if you:  Have ever smoked; and  Are biologically male; and  Are between the ages of 65 and 75.  STIs (sexually transmitted infections)  Before age 24: Ask your care team if you should be screened for STIs.  After age 24: Get screened for STIs if you're at risk. You are at risk for STIs (including HIV) if:  You are sexually active with more than one person.  You don't use condoms every time.  You or a partner was diagnosed with a sexually transmitted infection.  If you are at risk for HIV, ask about PrEP medicine to prevent HIV.  Get tested for HIV at least once in your life, whether you are at risk for HIV or not.  Cancer screening tests  Cervical cancer screening: If you have a cervix, begin getting regular cervical cancer screening tests at age 21. Most people who have regular screenings with normal results can stop after age 65. Talk about this with your provider.  Breast cancer scan (mammogram): If you've ever had breasts, begin having regular mammograms starting at age 40. This is a scan to check for breast cancer.  Colon cancer screening: It is important to start screening for colon cancer at age 45.  Have a colonoscopy test every 10 years (or more often if you're at risk) Or, ask your provider about stool tests like a FIT test every year or Cologuard test every 3 years.  To learn more about your testing options, visit: www.CareToSave/773029.pdf.  For help making a decision, visit: tobias/yz13204.  Prostate cancer screening test: If you have a  prostate and are age 55 to 69, ask your provider if you would benefit from a yearly prostate cancer screening test.  Lung cancer screening: If you are a current or former smoker age 50 to 80, ask your care team if ongoing lung cancer screenings are right for you.  For informational purposes only. Not to replace the advice of your health care provider. Copyright   2023 [facility]. All rights reserved. Clinically reviewed by the [facility] Transitions Program. [employer] [id number] - REV 04/24.

## 2024-06-18 NOTE — PROGRESS NOTES
Preventive Care Visit  [facility] [place]  Estee Garcia MD, Family Medicine  Jun 18, 2024      Assessment & Plan       Factor V Leiden (H24)  Patient is heterozygous for factor V leiden and has had 2 VTE (PE in 2020 when he was hospitalized with covid 19 and PE in 2023 after his achilles tendon rupture repair). He has talked with [place] [facility] vascular surgery regarding whether he needs indefinite anticoagulation and they told him it is up to him. He is quite active and has some activities that would put him at risk of serious bleed (he regularly goes skiing). Currently he is taking 5mg daily rather than the prescribed 5mg BID. I discussed that I don't think this will decrease his bleeding risk significantly. A more appropriate reduction in dose would be 2.5mg BID. He plans to hold eliquis prior to ski trips and I advised that if he does that prior to a plane ride he should get up to walk on the plan regularly and wear compression socks. For now he plans to continue 5mg eliquis daily. We can continue to have a risk/benefit discussion.     Routine history and physical examination of adult  Patient is up to date on his colonoscopy, PSA testing, and does not need lung cancer or AAA screening. Colonoscopy was normal in 2023, next due 2033. PSA has been followed by [place] [facility] u[facility] and had a brief bump up but now has been stable for months around 5. No biopsy planned/needed at this time. We did obtain a new cholesterol panel today and resulting ascvd risk is 6.4%. I recommended to patient via mychart (after result came back) that diet and exercise would be a good first step toward reducing his risk and he is wanting to avoid additional medications if possible.     We updated PMH, PSH, family history and social histories today and went through updates from all specialists at [facility].   - Lipid panel reflex to direct LDL Non-fasting; Future  - Lipid panel reflex to direct LDL Non-fasting    Cardiac  "sarcoid  Recently was able to move to oral methotrexate, he is hoping his dose can be reduced. It's hard for him to be immune compromised because he is very social. Has follow up scheduled with [facility].               BMI  Estimated body mass index is 27.43 kg/m  as calculated from the following:    Height as of this encounter: 1.73 m (5' 8.11\").    Weight as of this encounter: 82.1 kg (181 lb).       Counseling  Appropriate preventive services were discussed with this patient, including applicable screening as appropriate for fall prevention, nutrition, physical activity, Tobacco-use cessation, weight loss and cognition.  Checklist reviewing preventive services available has been given to the patient.  Reviewed patient's diet, addressing concerns and/or questions.     History of recurrent hordeola  Discussed treatment of blepharitis as a method of preventing. If he has bumps that do not resolve, consider chalazion and ophthalmology referral    Tinnitus  I think his is most consistent with age-related hearing loss. He has upcoming audiology appt. Normal ear exam today.           Return in about 53 weeks (around 6/24/2025) for Annual Wellness Visit.    Anurag Gunter is a 59 year old, presenting for the following:  Physical          6/18/2024     2:00 PM   Additional Questions   Roomed by Moe/Denisse         6/18/2024    Information    services provided? No        Health Care Directive  Patient does not have a Health Care Directive or Living Will: Will look over at home    HPI    Had been on subcutaneous methotrexate then last year switched to oral methotrexate.     August 5th rheumatology    Has been taking eliquis only during the day, nighttime dose he has stopped.     Styes in the left eye    Goes to urology- going on the 5th for cystoscopy. Going to discuss procedure for BPH.   Most recent PSA was 5.3. Previously was 8 but otherwise stable     Hernia surgery in 2022     Will get full skin check " in August.     Carpal tunnel saw hand specialist at [facility]. 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   Tried fisoten supplement as a part of a study which did not help  Voltaren helps.   Plans to continue voltaren, will hold off on PT or injection for CT    Tinnitus  Has had hearing checked at [facility]  Has some hearing loss both sides.   Not better or worse in any situations.   No pain  High pitched no pulsating, constant hum. Used to it, worse at night.   Is due for audiology again        6/15/2024   General Health   How would you rate your overall physical health? Good   Feel stress (tense, anxious, or unable to sleep) To some extent   (!) STRESS CONCERN      6/15/2024   Nutrition   Three or more servings of calcium each day? Yes   Diet: Regular (no restrictions)   How many servings of fruit and vegetables per day? (!) 2-3   How many sweetened beverages each day? 0-1         6/15/2024   Exercise   Days per week of moderate/strenous exercise 4 days   Average minutes spent exercising at this level 30 min         6/15/2024   Social Factors   Frequency of gathering with friends or relatives More than three times a week   Worry food won't last until get money to buy more No   Food not last or not have enough money for food? No   Do you have housing?  Yes   Are you worried about losing your housing? No   Lack of transportation? No   Unable to get utilities (heat,electricity)? No         6/15/2024   Fall Risk   Fallen 2 or more times in the past year? No   Trouble with walking or balance? No          6/15/2024   Dental   Dentist two times every year? Yes         6/15/2024   TB Screening   Were you born outside of the [place]? No         Today's PHQ-2 Score:       6/17/2024     7:14 PM   PHQ-2 ( 1999 [employer])   Q1: Little interest or pleasure in doing things 0   Q2: Feeling down, depressed or hopeless 0   PHQ-2 Score 0   Q1: Little interest or pleasure in doing things Not at all   Q2: Feeling down, depressed or hopeless Not at all   PHQ-2  Score 0           6/15/2024   Substance Use   Alcohol more than 3/day or more than 7/wk No   Do you use any other substances recreationally? No     Social History     Tobacco Use    Smoking status: Never     Passive exposure: Never    Smokeless tobacco: Never    Tobacco comments:     2/ cardiac sarcoidosis   Vaping Use    Vaping status: Never Used   Substance Use Topics    Alcohol use: Yes     Alcohol/week: 0.0 standard drinks of alcohol     Comment: 3-4 drinks/week    Drug use: No             6/15/2024   One time HIV Screening   Previous HIV test? I don't know         6/15/2024   STI Screening   New sexual partner(s) since last STI/HIV test? No   Last PSA:   PSA   Date Value Ref Range Status   07/10/2018 1.8 0.0 - 3.5 ng/mL Final     ASCVD Risk   The ASCVD Risk score (Leonel GURROLA, et al., 2019) failed to calculate for the following reasons:    Cannot find a previous HDL lab    Cannot find a previous total cholesterol lab      Reviewed and updated as needed this visit by Provider                    Past Medical History:   Diagnosis Date    Acute pulmonary embolism with acute cor pulmonale (H) 03/26/2020    AK (actinic keratosis) 06/08/2017    Benign non-nodular prostatic hyperplasia with lower urinary tract symptoms 03/01/2016    Calcified lymph nodes 07/18/2019    consistent w/ Sarcoid - 2016    Cardiac sarcoidosis 05/25/2021    Factor V Leiden (H24)     Family history of breast cancer     Mother and Grandmother    H/O skin graft 1993    fingers    H/O vasectomy     Hemoptysis 03/13/2020    History of pulmonary embolism 07/21/2020    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     Objective    Exam  /71   Pulse 58   Temp 97.7  F (36.5  C)   Resp 20   Ht 1.73 m (5' 8.11\")   Wt 82.1 kg (181 lb)   SpO2 96%   BMI 27.43 kg/m     Estimated body mass index is 27.43 kg/m  as calculated from the following:    Height as of this encounter: 1.73 m (5' 8.11\").    Weight as of this encounter: 82.1 kg (181 lb).    Physical Exam  GENERAL: alert and no distress  HEENT: Normal ear canals and Tms BL  NECK: no adenopathy, no asymmetry, masses, or scars  RESP: lungs clear to auscultation - no rales, rhonchi or wheezes  CV: Heart sounds quiet. regular rate and rhythm, normal S1 S2, no S3 or S4, no murmur, click or rub, no peripheral edema  ABDOMEN: soft, nontender, no hepatosplenomegaly, no masses and bowel sounds normal  MS: no gross musculoskeletal defects noted, no edema        Signed Electronically by: Estee Garcia MD    "

## 2024-06-19 LAB
CHOLEST SERPL-MCNC: 181 MG/DL
FASTING STATUS PATIENT QL REPORTED: ABNORMAL
HDLC SERPL-MCNC: 57 MG/DL
LDLC SERPL CALC-MCNC: 108 MG/DL
NONHDLC SERPL-MCNC: 124 MG/DL
TRIGL SERPL-MCNC: 80 MG/DL

## 2024-08-25 DIAGNOSIS — Z86.711 HISTORY OF PULMONARY EMBOLISM: ICD-10-CM

## 2024-08-26 RX ORDER — APIXABAN 5 MG/1
5 TABLET, FILM COATED ORAL 2 TIMES DAILY
Qty: 180 TABLET | Refills: 3 | Status: SHIPPED | OUTPATIENT
Start: 2024-08-26

## 2024-08-26 NOTE — TELEPHONE ENCOUNTER
New PCP, will route to PCP to review and complete further action. Patient last seen 6/18/24 by jatinder Ruano to continue Eliquis. Normal PLT, GFR and serum Creatinine on 5/30/2024. Please review rx refill. Thank you. Caden GUTHRIE

## 2024-10-11 ENCOUNTER — ALLIED HEALTH/NURSE VISIT (OUTPATIENT)
Dept: FAMILY MEDICINE | Facility: CLINIC | Age: 59
End: 2024-10-11
Payer: COMMERCIAL

## 2024-10-11 DIAGNOSIS — Z23 NEED FOR PROPHYLACTIC VACCINATION AND INOCULATION AGAINST INFLUENZA: Primary | ICD-10-CM

## 2024-10-11 DIAGNOSIS — Z23 HIGH PRIORITY FOR 2019-NCOV VACCINE: ICD-10-CM

## 2024-10-11 PROCEDURE — 90673 RIV3 VACCINE NO PRESERV IM: CPT

## 2024-10-11 PROCEDURE — 90471 IMMUNIZATION ADMIN: CPT

## 2024-10-11 PROCEDURE — 99207 PR NO CHARGE NURSE ONLY: CPT

## 2024-10-11 PROCEDURE — 91320 SARSCV2 VAC 30MCG TRS-SUC IM: CPT

## 2024-10-11 PROCEDURE — 90480 ADMN SARSCOV2 VAC 1/ONLY CMP: CPT

## 2024-11-12 ENCOUNTER — OFFICE VISIT (OUTPATIENT)
Dept: FAMILY MEDICINE | Facility: CLINIC | Age: 59
End: 2024-11-12
Payer: COMMERCIAL

## 2024-11-12 VITALS
BODY MASS INDEX: 26.63 KG/M2 | WEIGHT: 186.04 LBS | DIASTOLIC BLOOD PRESSURE: 78 MMHG | HEART RATE: 55 BPM | HEIGHT: 70 IN | TEMPERATURE: 98 F | SYSTOLIC BLOOD PRESSURE: 122 MMHG | OXYGEN SATURATION: 98 % | RESPIRATION RATE: 20 BRPM

## 2024-11-12 DIAGNOSIS — Z11.4 SCREENING FOR HIV (HUMAN IMMUNODEFICIENCY VIRUS): Primary | ICD-10-CM

## 2024-11-12 DIAGNOSIS — H00.019 HORDEOLUM, UNSPECIFIED HORDEOLUM TYPE, UNSPECIFIED LATERALITY: ICD-10-CM

## 2024-11-12 RX ORDER — POLYMYXIN B SULFATE AND TRIMETHOPRIM 1; 10000 MG/ML; [USP'U]/ML
SOLUTION OPHTHALMIC
Qty: 10 ML | Refills: 0 | Status: SHIPPED | OUTPATIENT
Start: 2024-11-12

## 2024-11-12 NOTE — PROGRESS NOTES
"  Assessment & Plan     Wrist pain  Pain was relieved on recent trip during warm humid weather with a diet very rich in fresh fish. No numbness/tingling in the fingers. I suspect pain is from arthritis rather than carpal tunnel, specifically radiocarpal arthritis. Interestingly he went off his methotrexate recently (which he was on for cardiac sarcoid) but he hasn't noticed if that made a difference with wrist pain. I will discuss the case with our sports medicine doctor but I think Jani would benefit from seeing him for possible steroid injection. In the meantime, continue voltaren and continue to incorporate fish in the diet.    Hordeolum, unspecified hordeolum type, unspecified laterality  Jani intermittently gets hordeola. No active one today. We discussed blepharitis care including washing the lids and if hordeolum occurs applying warm compress. Ok to use artificial tears if eyes are dry or itchy. If he gets a hordeolum, I advised he could try polytrim drops.   - polymixin b-trimethoprim (POLYTRIM) 85787-7.1 UNIT/ML-% ophthalmic solution; Use 1 drop in affected eye 4 times daily    For upcoming prostatectomy, we discussed anticoagulation plan. I agree with decreasing eliquis to 2.5 mg BID when that is approved by his cardiologist. He will go off his eliquis when he is skiing this winter given the balance of risks and benefits.           BMI  Estimated body mass index is 26.84 kg/m  as calculated from the following:    Height as of this encounter: 1.773 m (5' 9.8\").    Weight as of this encounter: 84.4 kg (186 lb 0.6 oz).         No follow-ups on file.    Subjective   Jani is a 59 year old, presenting for the following health issues:  RECHECK, Eye Problem, and Wrist    HPI     Prostatectomy on Monday with [facility]. He was told he will have a catheter for 1-3 days. He was told recovery takes about 30 days and he can't do normal exercise during that time. He's hoping to be back to normal in time for ski season.     He " "will go off eliquis Thursday. Then they are starting him on lovenox after surgery and they will determine when to restart eliquis based on how the procedure goes.     He takes eliquis- it's prescribed 5mg BID but he takes 2.5 am 5mg PM because of bruising he gets. His cardiologist and vascular doctors have talked with him about decreasing to 2.5mg BID at some point. His reasoning for anticoagulation is:  --PE during covid infection in 2020 and PE after achilles surgery in 2023  --Afib (one very short (4 second) run of afib detected on ICD interrogation),  [id number]  --Heterozygous Factor V Leiden    Wrist pain both sides for years  Uses voltaren gel both wrists which helps  Has been diagnosed with carpal tunnel by Dr. Rhoades in the past and got steroid injection which was helpful.  More recently was seen by PMR at [place] for this and was told pain was more consistent with arthritis.   Not waking up at night shaking hands.  No numbness in the fingers.   Wrists feel stiff.   Recently went on a sailing trip in the [place] and pain was much better during that trip- he's wondering if the weather was a factor.   Was taken off methotrexate which he had been on for cardiac sarcoid.      Sharp pains with moving the wrong way           Review of Systems  Constitutional, HEENT, cardiovascular, pulmonary, gi and gu systems are negative, except as otherwise noted.      Objective    /78 (BP Location: Right arm, Patient Position: Sitting, Cuff Size: Adult Regular)   Pulse 55   Temp 98  F (36.7  C) (Oral)   Resp 20   Ht 1.773 m (5' 9.8\")   Wt 84.4 kg (186 lb 0.6 oz)   SpO2 98%   BMI 26.84 kg/m    Body mass index is 26.84 kg/m .  Physical Exam   GENERAL: alert and no distress  EYES: Eyes grossly normal to inspection, PERRL and conjunctivae and sclerae normal  NECK: no adenopathy, no asymmetry, masses, or scars  MS: no gross musculoskeletal defects noted, no edema  ORTHO: Wrist Exam: Bilateral wrists:  Inspection: " no swelling  no effusion  Palpation: Tender: Tenderness:, distal radius, non tender at snuff box, cmc  Non-tender: distal ulna, snuff box  Range of Motion: flexion:  mildly decreased, extension:  70 degrees  Strength: no deficits  Special tests: negative Tinel's at carpal tunnel.  , negative Phalen's.  , negative Finkelstein's.      [facility] - 01/15/2024    EXAM:  DX WRIST BILATERAL 3+ VIEWS     IMPRESSION:   Mild to moderate degenerative arthritic changes involving the bilateral radiocarpal joints, more pronounced in the right. No acute fracture or dislocation. Remainder mild scattered degenerative changes of the visualized joints. No erosions.         The longitudinal plan of care for the diagnosis(es)/condition(s) as documented were addressed during this visit. Due to the added complexity in care, I will continue to support Jani in the subsequent management and with ongoing continuity of care.    Signed Electronically by: Estee Garcia MD

## 2024-11-12 NOTE — PATIENT INSTRUCTIONS
Before the procedure:  1. Stop Apixaban prior to the procedure (3 full days off  Anticoagulation).     After the procedure:  1. Start enoxaparin 40 mg once a day as soon as it is safe from the surgical perspective, and until the oral anticoagulant can be restarted.   2. Restart the direct oral anticoagulant 48-72 hours after the procedure, when clinically safe. Note that the oral anticoagulants have a short onset of action and will be therapeutic within 1 hour of administration.    Continue with the soap washes of eyelids and as needed over the counter tears.     If you get a stye, apply warm packs and on the first day, use polytrim drops.

## 2024-11-14 ENCOUNTER — LAB (OUTPATIENT)
Dept: LAB | Facility: CLINIC | Age: 59
End: 2024-11-14
Payer: COMMERCIAL

## 2024-11-14 DIAGNOSIS — N13.8 BENIGN PROSTATIC HYPERPLASIA WITH URINARY OBSTRUCTION: Primary | ICD-10-CM

## 2024-11-14 DIAGNOSIS — N40.1 BENIGN PROSTATIC HYPERPLASIA WITH URINARY OBSTRUCTION: ICD-10-CM

## 2024-11-14 DIAGNOSIS — N13.8 BENIGN PROSTATIC HYPERPLASIA WITH URINARY OBSTRUCTION: ICD-10-CM

## 2024-11-14 DIAGNOSIS — N40.1 BENIGN PROSTATIC HYPERPLASIA WITH URINARY OBSTRUCTION: Primary | ICD-10-CM

## 2024-11-14 LAB
ALBUMIN UR-MCNC: NEGATIVE MG/DL
APPEARANCE UR: CLEAR
BACTERIA #/AREA URNS HPF: ABNORMAL /HPF
BILIRUB UR QL STRIP: NEGATIVE
COLOR UR AUTO: YELLOW
GLUCOSE UR STRIP-MCNC: NEGATIVE MG/DL
HGB UR QL STRIP: ABNORMAL
KETONES UR STRIP-MCNC: NEGATIVE MG/DL
LEUKOCYTE ESTERASE UR QL STRIP: NEGATIVE
NITRATE UR QL: NEGATIVE
PH UR STRIP: 7 [PH] (ref 5–7)
RBC #/AREA URNS AUTO: ABNORMAL /HPF
SP GR UR STRIP: 1.01 (ref 1–1.03)
SQUAMOUS #/AREA URNS AUTO: ABNORMAL /LPF
UROBILINOGEN UR STRIP-ACNC: 0.2 E.U./DL
WBC #/AREA URNS AUTO: ABNORMAL /HPF

## 2024-11-16 LAB — BACTERIA UR CULT: NORMAL

## 2025-05-19 ENCOUNTER — PATIENT OUTREACH (OUTPATIENT)
Dept: CARE COORDINATION | Facility: CLINIC | Age: 60
End: 2025-05-19
Payer: COMMERCIAL

## 2025-07-10 ENCOUNTER — OFFICE VISIT (OUTPATIENT)
Dept: FAMILY MEDICINE | Facility: CLINIC | Age: 60
End: 2025-07-10
Payer: COMMERCIAL

## 2025-07-10 VITALS
BODY MASS INDEX: 26.63 KG/M2 | DIASTOLIC BLOOD PRESSURE: 89 MMHG | TEMPERATURE: 98.1 F | HEART RATE: 57 BPM | SYSTOLIC BLOOD PRESSURE: 128 MMHG | HEIGHT: 70 IN | WEIGHT: 186 LBS | OXYGEN SATURATION: 98 % | RESPIRATION RATE: 18 BRPM

## 2025-07-10 DIAGNOSIS — Z86.711 HISTORY OF PULMONARY EMBOLISM: ICD-10-CM

## 2025-07-10 DIAGNOSIS — H00.019 HORDEOLUM, UNSPECIFIED HORDEOLUM TYPE, UNSPECIFIED LATERALITY: ICD-10-CM

## 2025-07-10 DIAGNOSIS — Z00.00 ROUTINE GENERAL MEDICAL EXAMINATION AT A HEALTH CARE FACILITY: Primary | ICD-10-CM

## 2025-07-10 PROBLEM — K40.90 LEFT INGUINAL HERNIA: Status: RESOLVED | Noted: 2020-10-30 | Resolved: 2025-07-10

## 2025-07-10 PROBLEM — B34.2 CORONAVIRUS INFECTION: Status: RESOLVED | Noted: 2020-03-13 | Resolved: 2025-07-10

## 2025-07-10 PROBLEM — Z95.810 AUTOMATIC IMPLANTABLE CARDIOVERTER-DEFIBRILLATOR IN SITU: Status: ACTIVE | Noted: 2023-07-20

## 2025-07-10 PROBLEM — Z79.01 LONG TERM CURRENT USE OF ANTICOAGULANT THERAPY: Status: ACTIVE | Noted: 2024-11-05

## 2025-07-10 SDOH — HEALTH STABILITY: PHYSICAL HEALTH: ON AVERAGE, HOW MANY DAYS PER WEEK DO YOU ENGAGE IN MODERATE TO STRENUOUS EXERCISE (LIKE A BRISK WALK)?: 6 DAYS

## 2025-07-10 ASSESSMENT — SOCIAL DETERMINANTS OF HEALTH (SDOH): HOW OFTEN DO YOU GET TOGETHER WITH FRIENDS OR RELATIVES?: MORE THAN THREE TIMES A WEEK

## 2025-07-10 NOTE — PROGRESS NOTES
"Preventive Care Visit  [facility] [facility]  Blair Flowers MD, Family Medicine  Jul 10, 2025      Assessment & Plan     (Z00.00) Routine general medical examination at a health care facility  (primary encounter diagnosis)  Comment: Reviewed medical history including hx PE/VTE as below, patient follows at [facility] routinely with cardiology for hx of cardiac sarcoidosis, rheumatology, dermatology. No identified concerns today. Actively exercises and dietary intake appropriate. Reviewed and updated medications, history.  Plan: as above    (Z86.711) History of pulmonary embolism  Comment: Patient had x2 episodes of PE in 2020 s/p covid infection and 2023 s/p immobilization after achilles tendon rupture. Recommended to continue long term anticoagulation per his [facility] vascular surgery team. Adjusted to 2.5 mg BID. Refilled and continue.   Plan: apixaban ANTICOAGULANT (ELIQUIS ANTICOAGULANT)         2.5 MG tablet            (H00.019) Hordeolum, unspecified hordeolum type, unspecified laterality  Comment: Pt requested lubricating drops, stable, improving. Continues with warm compresses as needed.  Plan: dextran 70-hypromellose (TEARS NATURALE FREE         PF) 0.1-0.3 % ophthalmic solution          Patient has been advised of split billing requirements and indicates understanding: Yes    BMI  Estimated body mass index is 26.69 kg/m  as calculated from the following:    Height as of this encounter: 1.778 m (5' 10\").    Weight as of this encounter: 84.4 kg (186 lb).       Counseling  Appropriate preventive services were addressed with this patient via screening, questionnaire, or discussion as appropriate for fall prevention, nutrition, physical activity, Tobacco-use cessation, social engagement, weight loss and cognition.  Checklist reviewing preventive services available has been given to the patient.  Reviewed patient's diet, addressing concerns and/or questions.   He is at risk for " "psychosocial distress and has been provided with information to reduce risk.       Anurag Gunter is a 60 year old, presenting for the following:  Physical    He reports no concerns today. We did review and discuss his medical history as follows.     He is following with [facility] for cardiac sarcoidosis. Has been taking eliquis 5 mg BID, wants to reduce to 2.5 mg BID. He takes this due to hx of Factor V Leiden, cardiac sarcoidosis, hx of PE/VTE in 2020 2/2 covid infection, then had achilles tendon rupture in 2023 in which he also developed PE due to immobilization. Has ICD implanted since 2019. Has seen vascular specialist, follows annually with [facility]. He was told \"it was up to him\" if he wanted to continue anticoagulation. He cites frequent bruising as a reason for wanting to adjust his dosage. Has been working with vascular on reducing dosage and agreed with decreasing to 2.5 mg BID.     No longer taking flomax, had laser surgery on prostate 11/2024. No additional daily medications.     Had CSI R wrist with rheumatology, plans to have repeat injection L wrist for arthritis.     Hx of hordoleum BL eyes, does warm compresses as needed, eyes get itchy, scratchy later in the day.     No ETOH/tobacco/recreational drug use, regular 3-4x/week exercise and exercises with his dog. Getting adequate steps. Used to run marathons. and continues to ski.     Reports following with derm and audiology at [place], considering hearing aids but has not pursued yet.       7/10/2025     8:26 AM   Additional Questions   Roomed by Lio   Accompanied by self         7/10/2025    Information    services provided? No            Advance Care Planning    Patient states has Health Care Directive and will send to [employer].        7/10/2025   General Health   How would you rate your overall physical health? Good   Feel stress (tense, anxious, or unable to sleep) To some extent   (!) STRESS CONCERN      " 7/10/2025   Nutrition   Three or more servings of calcium each day? Yes   Diet: Regular (no restrictions)   How many servings of fruit and vegetables per day? (!) 2-3   How many sweetened beverages each day? 0-1         7/10/2025   Exercise   Days per week of moderate/strenous exercise 6 days         7/10/2025   Social Factors   Frequency of gathering with friends or relatives More than three times a week   Worry food won't last until get money to buy more No   Food not last or not have enough money for food? No   Do you have housing? (Housing is defined as stable permanent housing and does not include staying outside in a car, in a tent, in an abandoned building, in an overnight shelter, or couch-surfing.) Yes   Are you worried about losing your housing? No   Lack of transportation? No   Unable to get utilities (heat,electricity)? No         7/10/2025   Fall Risk   Fallen 2 or more times in the past year? No   Trouble with walking or balance? No          7/10/2025   Dental   Dentist two times every year? Yes         Today's PHQ-2 Score:       7/10/2025     8:24 AM   PHQ-2 ( 1999 [employer])   Q1: Little interest or pleasure in doing things 0   Q2: Feeling down, depressed or hopeless 0   PHQ-2 Score 0    Q1: Little interest or pleasure in doing things Not at all   Q2: Feeling down, depressed or hopeless Not at all   PHQ-2 Score 0       Patient-reported           7/10/2025   Substance Use   Alcohol more than 3/day or more than 7/wk No   Do you use any other substances recreationally? No     Social History     Tobacco Use    Smoking status: Never     Passive exposure: Never    Smokeless tobacco: Never    Tobacco comments:     2/ cardiac sarcoidosis   Vaping Use    Vaping status: Never Used   Substance Use Topics    Alcohol use: Yes     Alcohol/week: 0.0 standard drinks of alcohol     Comment: 3-4 drinks/week    Drug use: No           7/10/2025   STI Screening   New sexual partner(s) since last STI/HIV test? No   Last PSA:  "  PSA   Date Value Ref Range Status   07/10/2018 1.8 0.0 - 3.5 ng/mL Final     ASCVD Risk   The 10-year ASCVD risk score (Leonel GURROLA, et al., 2019) is: 8.6%    Values used to calculate the score:      Age: 60 years      Sex: Male      Is Non- : No      Diabetic: No      Tobacco smoker: No      Systolic Blood Pressure: 128 mmHg      Is BP treated: No      HDL Cholesterol: 57 mg/dL      Total Cholesterol: 181 mg/dL         Reviewed and updated as needed this visit by Provider                     Objective    Exam  /89 (BP Location: Right arm, Patient Position: Sitting)   Pulse 57   Temp 98.1  F (36.7  C) (Oral)   Resp 18   Ht 1.778 m (5' 10\")   Wt 84.4 kg (186 lb)   SpO2 98%   BMI 26.69 kg/m     Estimated body mass index is 26.69 kg/m  as calculated from the following:    Height as of this encounter: 1.778 m (5' 10\").    Weight as of this encounter: 84.4 kg (186 lb).    Physical Exam  GENERAL: alert and no distress  NECK: no adenopathy, no asymmetry, masses, or scars  RESP: lungs clear to auscultation - no rales, rhonchi or wheezes  CV: regular rate and rhythm, normal S1 S2, no S3 or S4, no murmur, click or rub, no peripheral edema  ABDOMEN: soft, nontender, no hepatosplenomegaly, no masses and bowel sounds normal  MS: no gross musculoskeletal defects noted, no edema        Signed Electronically by: MD Blair Little MD  PGY-3  [facility]  [facility]  July 10, 2025     Precepted with: Dr. Crane  "

## 2025-07-10 NOTE — PATIENT INSTRUCTIONS
Patient Education   Preventive Care Advice   This is general advice given by our system to help you stay healthy. However, your care team may have specific advice just for you. Please talk to your care team about your preventive care needs.  Nutrition  Eat 5 or more servings of fruits and vegetables each day.  Try wheat bread, brown rice and whole grain pasta (instead of white bread, rice, and pasta).  Get enough calcium and vitamin D. Check the label on foods and aim for 100% of the RDA (recommended daily allowance).  Lifestyle  Exercise at least 150 minutes each week  (30 minutes a day, 5 days a week).  Do muscle strengthening activities 2 days a week. These help control your weight and prevent disease.  No smoking.  Wear sunscreen to prevent skin cancer.  Have a dental exam and cleaning every 6 months.  Yearly exams  See your health care team every year to talk about:  Any changes in your health.  Any medicines your care team has prescribed.  Preventive care, family planning, and ways to prevent chronic diseases.  Shots (vaccines)   HPV shots (up to age 26), if you've never had them before.  Hepatitis B shots (up to age 59), if you've never had them before.  COVID-19 shot: Get this shot when it's due.  Flu shot: Get a flu shot every year.  Tetanus shot: Get a tetanus shot every 10 years.  Pneumococcal, hepatitis A, and RSV shots: Ask your care team if you need these based on your risk.  Shingles shot (for age 50 and up)  General health tests  Diabetes screening:  Starting at age 35, Get screened for diabetes at least every 3 years.  If you are younger than age 35, ask your care team if you should be screened for diabetes.  Cholesterol test: At age 39, start having a cholesterol test every 5 years, or more often if advised.  Bone density scan (DEXA): At age 50, ask your care team if you should have this scan for osteoporosis (brittle bones).  Hepatitis C: Get tested at least once in your life.  STIs (sexually  transmitted infections)  Before age 24: Ask your care team if you should be screened for STIs.  After age 24: Get screened for STIs if you're at risk. You are at risk for STIs (including HIV) if:  You are sexually active with more than one person.  You don't use condoms every time.  You or a partner was diagnosed with a sexually transmitted infection.  If you are at risk for HIV, ask about PrEP medicine to prevent HIV.  Get tested for HIV at least once in your life, whether you are at risk for HIV or not.  Cancer screening tests  Cervical cancer screening: If you have a cervix, begin getting regular cervical cancer screening tests starting at age 21.  Breast cancer scan (mammogram): If you've ever had breasts, begin having regular mammograms starting at age 40. This is a scan to check for breast cancer.  Colon cancer screening: It is important to start screening for colon cancer at age 45.  Have a colonoscopy test every 10 years (or more often if you're at risk) Or, ask your provider about stool tests like a FIT test every year or Cologuard test every 3 years.  To learn more about your testing options, visit:   .  For help making a decision, visit:   https://bit.ly/yq66438.  Prostate cancer screening test: If you have a prostate, ask your care team if a prostate cancer screening test (PSA) at age 55 is right for you.  Lung cancer screening: If you are a current or former smoker ages 50 to 80, ask your care team if ongoing lung cancer screenings are right for you.  For informational purposes only. Not to replace the advice of your health care provider. Copyright   2023 [facility]. All rights reserved. Clinically reviewed by the [facility]. [employer] 020253 - REV 01/24.  Learning About Stress  What is stress?     Stress is your body's response to a hard situation. Your body can have a physical, emotional, or mental response. Stress is a fact of life for most people, and it  affects everyone differently. What causes stress for you may not be stressful for someone else.  A lot of things can cause stress. You may feel stress when you go on a job interview, take a test, or run a race. This kind of short-term stress is normal and even useful. It can help you if you need to work hard or react quickly. For example, stress can help you finish an important job on time.  Long-term stress is caused by ongoing stressful situations or events. Examples of long-term stress include long-term health problems, ongoing problems at work, or conflicts in your family. Long-term stress can harm your health.  How does stress affect your health?  When you are stressed, your body responds as though you are in danger. It makes hormones that speed up your heart, make you breathe faster, and give you a burst of energy. This is called the fight-or-flight stress response. If the stress is over quickly, your body goes back to normal and no harm is done.  But if stress happens too often or lasts too long, it can have bad effects. Long-term stress can make you more likely to get sick, and it can make symptoms of some diseases worse. If you tense up when you are stressed, you may develop neck, shoulder, or low back pain. Stress is linked to high blood pressure and heart disease.  Stress also harms your emotional health. It can make you curtis, tense, or depressed. Your relationships may suffer, and you may not do well at work or school.  What can you do to manage stress?  You can try these things to help manage stress:   Do something active. Exercise or activity can help reduce stress. Walking is a great way to get started. Even everyday activities such as housecleaning or yard work can help.  Try yoga or suzanne chi. These techniques combine exercise and meditation. You may need some training at first to learn them.  Do something you enjoy. For example, listen to music or go to a movie. Practice your hobby or do volunteer  "work.  Meditate. This can help you relax, because you are not worrying about what happened before or what may happen in the future.  Do guided imagery. Imagine yourself in any setting that helps you feel calm. You can use online videos, books, or a teacher to guide you.  Do breathing exercises. For example:  From a standing position, bend forward from the waist with your knees slightly bent. Let your arms dangle close to the floor.  Breathe in slowly and deeply as you return to a standing position. Roll up slowly and lift your head last.  Hold your breath for just a few seconds in the standing position.  Breathe out slowly and bend forward from the waist.  Let your feelings out. Talk, laugh, cry, and express anger when you need to. Talking with supportive friends or family, a counselor, or a john leader about your feelings is a healthy way to relieve stress. Avoid discussing your feelings with people who make you feel worse.  Write. It may help to write about things that are bothering you. This helps you find out how much stress you feel and what is causing it. When you know this, you can find better ways to cope.  What can you do to prevent stress?  You might try some of these things to help prevent stress:  Manage your time. This helps you find time to do the things you want and need to do.  Get enough sleep. Your body recovers from the stresses of the day while you are sleeping.  Get support. Your family, friends, and community can make a difference in how you experience stress.  Limit your news feed. Avoid or limit time on social media or news that may make you feel stressed.  Do something active. Exercise or activity can help reduce stress. Walking is a great way to get started.  Where can you learn more?  Go to https://www.AnyLeaf.net/patiented  Enter N032 in the search box to learn more about \"Learning About Stress.\"  Current as of: October 24, 2024  Content Version: 14.5 2024-2025 [employer].   Care instructions adapted under license by your healthcare professional. If you have questions about a medical condition or this instruction, always ask your healthcare professional. [employer] disclaims any warranty or liability for your use of this information.

## 2025-07-11 NOTE — PROGRESS NOTES
Preceptor Attestation:  Patient's case reviewed and discussed with Blair Flowers MD resident and I evaluated the patient. I agree with written assessment and plan of care.  Supervising Physician:  HELADIO CARPENTER MD  [facility]

## (undated) DEVICE — SUCTION CANISTER MEDIVAC LINER 3000ML W/LID 65651-530

## (undated) DEVICE — NEEDLE HYPO MAGELLAN SAFETY 22GA 1 1/2IN 8881850215

## (undated) DEVICE — SUTURE VICRYL+ 0 27IN CT-1 UND VCP260H

## (undated) DEVICE — SOL NACL 0.9% IRRIG 1000ML BOTTLE 2F7124

## (undated) DEVICE — BANDAGE ELASTIC 4X550 LF DBL 593-94LF

## (undated) DEVICE — SPLINT ORTH FBGLS ORTHO-GLASS 4INX30IN OG-430PC

## (undated) DEVICE — SOL WATER IRRIG 1000ML BOTTLE 2F7114

## (undated) DEVICE — SU ETHILON 4-0 PS-2 18" BLACK 1667H

## (undated) DEVICE — DRSG GAUZE 4X4" TRAY 6939

## (undated) DEVICE — SUCTION MANIFOLD NEPTUNE 2 SYS 1 PORT 702-025-000

## (undated) DEVICE — CAST PADDING 6" STERILE 9046S

## (undated) DEVICE — CAST PADDING 4" STERILE 9044S

## (undated) DEVICE — SOLIDIFIER FLD 3.2OZ  CL-FREE F/ BIOHZRD WASTE 3000ML CANIST

## (undated) DEVICE — SU FIBERWIRE 2 38" T-8 NDL  AR-7206

## (undated) DEVICE — GLOVE BIOGEL PI SZ 7.5 40875

## (undated) DEVICE — PLATE GROUNDING ADULT W/CORD 9165L

## (undated) DEVICE — SUTURE VICRYL+ 2-0 27IN SH UND VCP417H

## (undated) DEVICE — SU VICRYL+ 3-0 27IN SH UND VCP416H

## (undated) DEVICE — SYR 10ML LL W/O NDL 302995

## (undated) DEVICE — DRSG ADAPTIC 3X3" 6112

## (undated) DEVICE — CUSTOM PACK LOWER EXTREMITY SOP5BLEHEA

## (undated) DEVICE — DRAPE STERI TOWEL LG 1010

## (undated) DEVICE — PREP POVIDONE-IODINE 10% SOLUTION 4OZ BOTTLE MDS093944

## (undated) DEVICE — PREP CHLORAPREP 26ML TINTED HI-LITE ORANGE 930815

## (undated) DEVICE — POSITIONER ARM CRADLE LAMINECTOMY DISP

## (undated) RX ORDER — GLYCOPYRROLATE 0.2 MG/ML
INJECTION, SOLUTION INTRAMUSCULAR; INTRAVENOUS
Status: DISPENSED
Start: 2023-06-15